# Patient Record
Sex: MALE | Race: OTHER
[De-identification: names, ages, dates, MRNs, and addresses within clinical notes are randomized per-mention and may not be internally consistent; named-entity substitution may affect disease eponyms.]

---

## 2018-12-01 ENCOUNTER — HOSPITAL ENCOUNTER (INPATIENT)
Dept: HOSPITAL 74 - FER | Age: 78
LOS: 5 days | Discharge: ANOTHER HOSPITAL | DRG: 184 | End: 2018-12-06
Attending: INTERNAL MEDICINE | Admitting: INTERNAL MEDICINE
Payer: COMMERCIAL

## 2018-12-01 VITALS — BODY MASS INDEX: 23.3 KG/M2
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
## 2018-12-01 DIAGNOSIS — R55: ICD-10-CM
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
## 2018-12-01 DIAGNOSIS — R00.1: ICD-10-CM

## 2018-12-01 DIAGNOSIS — S37.011A: ICD-10-CM

## 2018-12-01 DIAGNOSIS — S22.41XA: Primary | ICD-10-CM

## 2018-12-01 DIAGNOSIS — E11.9: ICD-10-CM

## 2018-12-01 DIAGNOSIS — I10: ICD-10-CM

## 2018-12-01 DIAGNOSIS — R31.9: ICD-10-CM

## 2018-12-01 DIAGNOSIS — N40.0: ICD-10-CM

## 2018-12-01 LAB
ALBUMIN SERPL-MCNC: 3.6 G/DL (ref 3.5–5)
ALP SERPL-CCNC: 52 U/L (ref 32–92)
ALT SERPL-CCNC: 28 U/L (ref 10–40)
ANION GAP SERPL CALC-SCNC: 6 MMOL/L (ref 8–16)
APPEARANCE UR: (no result)
AST SERPL-CCNC: 32 U/L (ref 10–42)
BASOPHILS # BLD: 0.3 % (ref 0–2)
BILIRUB SERPL-MCNC: 0.5 MG/DL (ref 0.2–1)
BUN SERPL-MCNC: 25 MG/DL (ref 7–18)
CALCIUM SERPL-MCNC: 8.9 MG/DL (ref 8.4–10.2)
CHLORIDE SERPL-SCNC: 104 MMOL/L (ref 98–107)
CO2 SERPL-SCNC: 25 MMOL/L (ref 22–28)
CREAT SERPL-MCNC: 0.9 MG/DL (ref 0.6–1.3)
DEPRECATED RDW RBC AUTO: 13.2 % (ref 11.9–15.9)
EOSINOPHIL # BLD: 1.2 % (ref 0–4.5)
EPITH CASTS URNS QL MICRO: (no result) /HPF
GLUCOSE SERPL-MCNC: 152 MG/DL (ref 74–106)
HCT VFR BLD CALC: 43 % (ref 35.4–49)
HGB BLD-MCNC: 14.6 GM/DL (ref 11.7–16.9)
LYMPHOCYTES # BLD: 10.8 % (ref 8–40)
MCH RBC QN AUTO: 30.3 PG (ref 25.7–33.7)
MCHC RBC AUTO-ENTMCNC: 33.8 G/DL (ref 32–35.9)
MCV RBC: 89.6 FL (ref 80–96)
MONOCYTES # BLD AUTO: 7.5 % (ref 3.8–10.2)
NEUTROPHILS # BLD: 80.2 % (ref 42.8–82.8)
PH UR: 6.5 [PH] (ref 4.5–8)
PLATELET # BLD AUTO: 173 K/MM3 (ref 134–434)
PMV BLD: 9.7 FL (ref 7.5–11.1)
POTASSIUM SERPLBLD-SCNC: 3.8 MMOL/L (ref 3.5–5.1)
PROT SERPL-MCNC: 5.7 G/DL (ref 6.4–8.3)
PROT UR QL STRIP: (no result)
RBC # BLD AUTO: 4.8 M/MM3 (ref 4–5.6)
RBC # BLD AUTO: >100 /HPF (ref 0–3)
SODIUM SERPL-SCNC: 135 MMOL/L (ref 136–145)
SP GR UR: >= 1.03 (ref 1.01–1.03)
UROBILINOGEN UR STRIP-MCNC: 1 MG/DL (ref 0.2–1)
WBC # BLD AUTO: 7 K/MM3 (ref 4–10.8)
WBC # UR AUTO: (no result) /UL (ref 0–2)

## 2018-12-01 PROCEDURE — G0378 HOSPITAL OBSERVATION PER HR: HCPCS

## 2018-12-01 PROCEDURE — A9502 TC99M TETROFOSMIN: HCPCS

## 2018-12-01 NOTE — PDOC
History of Present Illness





- General


History Source: Patient


Exam Limitations: No Limitations





- History of Present Illness


Initial Comments: 





12/01/18 20:49


The patient is a 78 year old male, with a significant PMH of HTN, Lyme disease (

diagnosed in August) and enlarged prostate,  who presents to the emergency 

department with an episode of vertigo that occurred 2 hours ago. The patient 

states he bent down to get something in the bathroom when he suddenly felt like 

the whole room was spinning. The patient admits to losing consciousness for a 

few seconds, falling and apparently hitting right sided body something within 

the bathroom. Family found patient conscious without evidence of seizure 

activity or incontinence. He experienced 1 episode of vomiting after the 

incident. The patient states this has never happened to him before and is 

currently complaining of right sided rib pain. He mentions he usually takes 

aspirin (81 mg)  everyday but didnt take it today. The patient denies chest 

pain, shortness of breath and  headache. Denies fever, chills, nausea, vomit, 

diarrhea and constipation.


 


Allergies: NKDA


Past surgical history: None reported


Social history: None reported


PCP: None reported 











<Jeni Gunn - Last Filed: 12/01/18 21:53>





<Dina Chavez - Last Filed: 12/02/18 00:40>





- General


Chief Complaint: Lightheaded


Stated Complaint: DIZZY


Time Seen by Provider: 12/01/18 19:24





Past History





<Jeni Gunn - Last Filed: 12/01/18 21:53>





- Past Medical History


COPD: No


Diabetes: Yes


 Disorders: Yes (BPH)


HTN: Yes





- Surgical History


Cholecystectomy: Yes





- Suicide/Smoking/Psychosocial Hx


Smoking History: Never smoked


Hx Alcohol Use: Yes (RARE)


Drug/Substance Use Hx: No





<Dina Chavez - Last Filed: 12/02/18 00:40>





- Past Medical History


Allergies/Adverse Reactions: 


 Allergies











Allergy/AdvReac Type Severity Reaction Status Date / Time


 


No Known Allergies Allergy   Verified 12/01/18 18:51











Home Medications: 


Ambulatory Orders





Aspirin [Aspirin EC] 81 mg PO HS 12/01/18 


Metformin HCl [Glucophage] 500 mg PO BID 12/01/18 


Tamsulosin HCl [Flomax] 0.4 mg PO BID 12/01/18 


Valsartan/Hydrochlorothiazide [Valsartan-Hctz 160-25 mg Tab] 1 each PO DAILY 12/ 01/18 











**Review of Systems





- Review of Systems


Able to Perform ROS?: Yes


Comments:: 





12/01/18 20:50


GENERAL/CONSTITUTIONAL: No fever or chills. No weakness.


HEAD, EYES, EARS, NOSE AND THROAT: No change in vision. No ear pain or 

discharge. No sore throat.


CARDIOVASCULAR: No chest pain or shortness of breath.


RESPIRATORY: No cough, wheezing, or hemoptysis.


GASTROINTESTINAL: No nausea, vomiting, diarrhea or constipation.


GENITOURINARY: No dysuria, frequency, or change in urination.


MUSCULOSKELETAL:+Rib pain 


SKIN: No rash


NEUROLOGIC: No headache, vertigo, loss of consciousness, or change in strength/

sensation.


ENDOCRINE: No increased thirst. No abnormal weight change.


HEMATOLOGIC/LYMPHATIC: No anemia, easy bleeding, or history of blood clots.


ALLERGIC/IMMUNOLOGIC: No hives or skin allergy.








<Jeni Gunn - Last Filed: 12/01/18 21:53>





*Physical Exam





- Vital Signs


 Last Vital Signs











Temp Pulse Resp BP Pulse Ox


 


 97.4 F L  46 L  16   144/72   97 


 


 12/01/18 18:33  12/01/18 18:33  12/01/18 18:33  12/01/18 18:33  12/01/18 18:33














- Physical Exam


Comments: 





12/01/18 20:51


GENERAL: Awake, alert, and fully oriented, in no acute distress


HEAD: Non bleeding half cm partial thickness laceration to the right forehead 

tenderness no ecchymosis or edema 


EYES: PERRLA, EOMI, sclera anicteric, conjunctiva clear


ENT: Auricles normal inspection, hearing grossly normal, nares patent, 

oropharynx clear without exudates. Moist mucosa


NECK: Normal ROM, supple, no lymphadenopathy, JVD, or masses


LUNGS: +Chest wall 3 cm x 4 cm edema mild tenderness to the right posterior 

chest from 7-9 ribs in posterior axillary line. Breath sounds equal, clear to 

auscultation bilaterally.  No wheezes, and no crackles


HEART: Regular rate and rhythm, normal S1 and S2, no murmurs, rubs or gallops


ABDOMEN:+ Notable for right upper and right flank moderate tenderness no 

guarding rebound or mass 


EXTREMITIES: Normal range of motion, no edema.  No clubbing or cyanosis. No 

cords, erythema, or tenderness


NEUROLOGICAL: Cranial nerves II through XII grossly intact.  Normal speech, 

normal gait


SKIN: Warm, Dry, normal turgor, no rashes or lesions noted.











<Jeni Gunn - Last Filed: 12/01/18 21:53>





- Vital Signs


 Last Vital Signs











Temp Pulse Resp BP Pulse Ox


 


 97.4 F L  46 L  16   144/72   97 


 


 12/01/18 18:33  12/01/18 18:33  12/01/18 18:33  12/01/18 18:33  12/01/18 18:33














<Dina Chavez - Last Filed: 12/02/18 00:40>





Moderate Sedation





- Procedure Monitoring


Vital Signs: 


Procedure Monitoring Vital Signs











Temperature  97.4 F L  12/01/18 18:33


 


Pulse Rate  46 L  12/01/18 18:33


 


Respiratory Rate  16   12/01/18 18:33


 


Blood Pressure  144/72   12/01/18 18:33


 


O2 Sat by Pulse Oximetry (%)  97   12/01/18 18:33











<Jeni Gunn - Last Filed: 12/01/18 21:53>





- Procedure Monitoring


Vital Signs: 


Procedure Monitoring Vital Signs











Temperature  97.4 F L  12/01/18 18:33


 


Pulse Rate  46 L  12/01/18 18:33


 


Respiratory Rate  16   12/01/18 18:33


 


Blood Pressure  144/72   12/01/18 18:33


 


O2 Sat by Pulse Oximetry (%)  97   12/01/18 18:33











<Dina Chavez - Last Filed: 12/02/18 00:40>





ED Treatment Course





- LABORATORY


CBC & Chemistry Diagram: 


 12/01/18 19:30





 12/01/18 19:30





- ADDITIONAL ORDERS


Additional order review: 


 Laboratory  Results











  12/01/18 12/01/18 12/01/18





  20:00 19:30 19:30


 


Sodium   135 L 


 


Potassium   3.8 


 


Chloride   104 


 


Carbon Dioxide   25 


 


Anion Gap   6 L 


 


BUN   25 H 


 


Creatinine   0.9 


 


Creat Clearance w eGFR   > 60 


 


Random Glucose   152 H 


 


Calcium   8.9 


 


Total Bilirubin   0.5 


 


AST   32 


 


ALT   28 


 


Alkaline Phosphatase   52 


 


Creatine Kinase   66 


 


Troponin I    < 0.03


 


Total Protein   5.7 L 


 


Albumin   3.6 


 


Urine Color  Yellow  


 


Urine Appearance  Cloudy  


 


Urine pH  6.5  


 


Ur Specific Gravity  >= 1.030  


 


Urine Protein  2+ H  


 


Urine Glucose (UA)  Negative  


 


Urine Ketones  Trace  


 


Urine Blood  3+ H  


 


Urine Nitrite  Negative  


 


Urine Bilirubin  Negative  


 


Urine Urobilinogen  1.0  


 


Ur Leukocyte Esterase  Negative  


 


Urine RBC  >100  


 


Urine WBC  2-5  


 


Ur Epithelial Cells  Few  








 











  12/01/18





  19:30


 


RBC  4.80


 


MCV  89.6


 


MCHC  33.8


 


RDW  13.2


 


MPV  9.7


 


Neutrophils %  80.2


 


Lymphocytes %  10.8


 


Monocytes %  7.5


 


Eosinophils %  1.2


 


Basophils %  0.3














<Jeni Gunn - Last Filed: 12/01/18 21:53>





- LABORATORY


CBC & Chemistry Diagram: 


 12/01/18 19:30





 12/01/18 19:30





<Dina Chavez - Last Filed: 12/02/18 00:40>





Medical Decision Making





- Medical Decision Making





Documentation has been prepared under my direction and personally reviewed by 

me in its entirety. I attest that this documented accurately reflects all work, 

treatment, procedures and medical decision making performed by me.





As noted above, this 78-year-old man with a history of hypertension/Lyme 

disease (diagnosed and treated a few months ago)/BPH was brought in by 

ambulance after an episode of vertigo and fall in the bathroom of his daughter'

s home (patient lives in [place] and is visiting the area).  He had a brief LOC 

after this severe vertigo; family did not witness the episode but was at patient

's side very quickly.  He had regained consciousness prior to their arrival.  

No incontinence or tonic-clonic movements noted.  Patient was episode of 

vomiting after the incident.  He states that vertigo resolved very quickly (

prior to arrival of EMS).  Patient's only complaint is right lower posterior 

back/flank discomfort.  Exam as noted.





12-lead electrocardiogram is performed and evaluated by me: Sinus bradycardia 

at 51 bpm; intervals, wave forms and axis are all normal.  No evidence of acute 

ST or T-wave abnormalities; no evidence of cardiac arrhythmia





Laboratory evaluation notable for prerenal azotemia with BUN of 25 and 

creatinine of 0.9.  Urinalysis shows hematuria with greater than 100 RBCs per 

high-power field.





Noncontrast head CT/chest and abdominal/pelvic CT with IV contrast ordered


12/01/18 23:47


Noncontrast head CT reveals no evidence of acute intracranial process; no skull 

fractures





Abdominal/pelvic CT interpreted by [facility]: Displaced fractures of the 

right ninth and 10th ribs and nondisplaced fracture of the 11th rib.  There is 

a circumferential right renal subcapsular hematoma that is 1.5 cm thick.


12/02/18 00:40


Case discussed with Dr Roth; patient will be admitted telemetry bed, 

observation status with diagnosis of syncope/multiple rib fractures/right renal 

hematoma





<Dina Chavez - Last Filed: 12/02/18 00:40>





*DC/Admit/Observation/Transfer





- Attestations


Scribe Attestion: 





12/01/18 20:53





Documentation prepared by Jeni Gunn, acting as medical scribe for Dina Chavez MD.





<Jeni Gunn - Last Filed: 12/01/18 21:53>





- Discharge Dispostion


Decision to Admit order: Yes





<Dina Chavez - Last Filed: 12/02/18 00:40>


Diagnosis at time of Disposition: 


Multiple rib fractures


Qualifiers:


 Encounter type: initial encounter Fracture type: closed Laterality: right 

Qualified Code(s): S22.41XA - Multiple fractures of ribs, right side, initial 

encounter for closed fracture





Renal hematoma, right


Qualifiers:


 Encounter type: initial encounter Qualified Code(s): S37.011A - Minor 

contusion of right kidney, initial encounter





Syncope


Qualifiers:


 Syncope type: unspecified Qualified Code(s): R55 - Syncope and collapse








- Discharge Dispostion


Condition at time of disposition: Stable

## 2018-12-02 LAB
ALBUMIN SERPL-MCNC: 3.2 G/DL (ref 3.5–5)
ALP SERPL-CCNC: 48 U/L (ref 32–92)
ALT SERPL-CCNC: 23 U/L (ref 10–40)
ANION GAP SERPL CALC-SCNC: 9 MMOL/L (ref 8–16)
AST SERPL-CCNC: 22 U/L (ref 10–42)
BILIRUB SERPL-MCNC: 1.1 MG/DL (ref 0.2–1)
BUN SERPL-MCNC: 22 MG/DL (ref 7–18)
CALCIUM SERPL-MCNC: 8.6 MG/DL (ref 8.4–10.2)
CHLORIDE SERPL-SCNC: 103 MMOL/L (ref 98–107)
CO2 SERPL-SCNC: 25 MMOL/L (ref 22–28)
CREAT SERPL-MCNC: 0.8 MG/DL (ref 0.6–1.3)
DEPRECATED RDW RBC AUTO: 13.4 % (ref 11.9–15.9)
GLUCOSE SERPL-MCNC: 122 MG/DL (ref 74–106)
HCT VFR BLD CALC: 36.4 % (ref 35.4–49)
HGB BLD-MCNC: 12.4 GM/DL (ref 11.7–16.9)
MAGNESIUM SERPL-MCNC: 1.8 MG/DL (ref 1.8–2.4)
MCH RBC QN AUTO: 30.9 PG (ref 25.7–33.7)
MCHC RBC AUTO-ENTMCNC: 34.1 G/DL (ref 32–35.9)
MCV RBC: 90.5 FL (ref 80–96)
PLATELET # BLD AUTO: 154 K/MM3 (ref 134–434)
PMV BLD: 9.8 FL (ref 7.5–11.1)
POTASSIUM SERPLBLD-SCNC: 3.8 MMOL/L (ref 3.5–5.1)
PROT SERPL-MCNC: 5 G/DL (ref 6.4–8.3)
RBC # BLD AUTO: 4.02 M/MM3 (ref 4–5.6)
SODIUM SERPL-SCNC: 137 MMOL/L (ref 136–145)
WBC # BLD AUTO: 7.3 K/MM3 (ref 4–10.8)

## 2018-12-02 RX ADMIN — INSULIN ASPART SCH: 100 INJECTION, SOLUTION INTRAVENOUS; SUBCUTANEOUS at 12:00

## 2018-12-02 RX ADMIN — INSULIN ASPART SCH: 100 INJECTION, SOLUTION INTRAVENOUS; SUBCUTANEOUS at 19:01

## 2018-12-02 RX ADMIN — INSULIN ASPART SCH: 100 INJECTION, SOLUTION INTRAVENOUS; SUBCUTANEOUS at 21:16

## 2018-12-02 RX ADMIN — VALSARTAN SCH MG: 160 TABLET, FILM COATED ORAL at 11:10

## 2018-12-02 RX ADMIN — INSULIN ASPART SCH: 100 INJECTION, SOLUTION INTRAVENOUS; SUBCUTANEOUS at 11:00

## 2018-12-02 RX ADMIN — TAMSULOSIN HYDROCHLORIDE SCH MG: 0.4 CAPSULE ORAL at 21:05

## 2018-12-02 RX ADMIN — HYDROCHLOROTHIAZIDE SCH MG: 25 TABLET ORAL at 11:10

## 2018-12-02 RX ADMIN — TAMSULOSIN HYDROCHLORIDE SCH MG: 0.4 CAPSULE ORAL at 11:10

## 2018-12-02 NOTE — HP
CHIEF COMPLAINT:dizziness 





PCP:





HISTORY OF PRESENT ILLNESS:


The patient is a 78 year old male, with a significant PMH of HTN, DM, Lyme 

disease (diagnosed in August) and enlarged prostate,  who presents to the 

emergency department with an episode of vertigo that occurred 2 hours ago. The 

patient states he bent down to get something in the bathroom when he suddenly 

felt like the whole room was spinning. The patient admits to losing 

consciousness for a few seconds, falling and apparently hitting right sided 

body something within the bathroom.  He experienced 1 episode of vomiting after 

the incident. 





patient seen at bedside this morning, denies dizziness, sob, chest pain.  

Denies fever, chills, nausea, vomit, diarrhea and constipation.


 





ER course was notable for:


(1)CT abd pelvis rib fracture 9th and 10th (displaced), 11th (non-displaced)


(2)Right renal subcapsular hematoma 


(3)no Leukocytosis 





Recent Travel:





PAST MEDICAL HISTORY:HTN, BPH, DM, 





PAST SURGICAL HISTORY:Cholecystectomy 





Social History:


Smoking:denies 


Alcohol:rarely 


Drugs:denies  





Family History:


AllergiesNKDA





No Known Allergies Allergy (Verified 12/01/18 18:51)


 








HOME MEDICATIONS:


 Home Medications











 Medication  Instructions  Recorded


 


Aspirin [Aspirin EC] 81 mg PO HS 12/01/18


 


Metformin HCl [Glucophage] 500 mg PO BID 12/01/18


 


Tamsulosin HCl [Flomax] 0.4 mg PO BID 12/01/18


 


Valsartan/Hydrochlorothiazide 1 each PO DAILY 12/01/18





[Valsartan-Hctz 160-25 mg Tab]  








REVIEW OF SYSTEMS


CONSTITUTIONAL: 


Absent:  fever, chills, diaphoresis, generalized weakness, malaise, loss of 

appetite, weight change


HEENT: 


Absent:  rhinorrhea, nasal congestion, throat pain, throat swelling, difficulty 

swallowing, mouth swelling, ear pain, eye pain, visual changes


CARDIOVASCULAR: 


Absent: chest pain, syncope, palpitations, irregular heart rate, lightheadedness

, peripheral edema


RESPIRATORY: 


Absent: cough, shortness of breath, dyspnea with exertion, orthopnea, wheezing, 

stridor, hemoptysis


GASTROINTESTINAL:


Absent: abdominal pain, abdominal distension, nausea, vomiting, diarrhea, 

constipation, melena, hematochezia


GENITOURINARY: 


Absent: dysuria, frequency, urgency, hesitancy, hematuria, flank pain, genital 

pain


MUSCULOSKELETAL: 


Absent: myalgia, arthralgia, joint swelling, back pain, neck pain


SKIN: 


Absent: rash, itching, pallor


HEMATOLOGIC/IMMUNOLOGIC: 


Absent: easy bleeding, easy bruising, lymphadenopathy, frequent infections


ENDOCRINE:


Absent: unexplained weight gain, unexplained weight loss, heat intolerance, 

cold intolerance


NEUROLOGIC: 


Absent: headache, focal weakness or paresthesias, dizziness, unsteady gait, 

seizure, mental status changes, bladder or bowel incontinence


PSYCHIATRIC: 


Absent: anxiety, depression, suicidal or homicidal ideation, hallucinations.








PHYSICAL EXAMINATION


 Vital Signs - 24 hr











  12/01/18 12/01/18 12/01/18





  18:33 21:00 21:29


 


Temperature 97.4 F L  


 


Pulse Rate 46 L  


 


Pulse Rate [  55 L 56 L





Apical]   


 


Respiratory 16 16 15





Rate   


 


Blood Pressure 144/72  


 


Blood Pressure  153/80 148/80





[Left Arm]   


 


O2 Sat by Pulse 97 97 98





Oximetry (%)   














  12/02/18 12/02/18 12/02/18





  00:33 01:52 04:33


 


Temperature 98.2 F 98.2 F 98.6 F


 


Pulse Rate 62 62 54 L


 


Pulse Rate [   





Apical]   


 


Respiratory 20 20 20





Rate   


 


Blood Pressure 144/60 144/60 124/50 L


 


Blood Pressure   





[Left Arm]   


 


O2 Sat by Pulse 97 97 





Oximetry (%)   














  12/02/18 12/02/18 12/02/18





  07:04 07:05 07:06


 


Temperature 98.6 F 98.6 F 98.6 F


 


Pulse Rate 51 L 56 L 63


 


Pulse Rate [   





Apical]   


 


Respiratory 18 18 18





Rate   


 


Blood Pressure 135/63 129/64 108/54 L


 


Blood Pressure   





[Left Arm]   


 


O2 Sat by Pulse   





Oximetry (%)   











GENERAL: Awake, alert, and fully oriented, in no acute distress.


HEAD: Normal with no signs of trauma.


EYES: Pupils equal, round and reactive to light, extraocular movements intact, 

sclera anicteric, conjunctiva clear. No lid lag.


EARS, NOSE, THROAT: Ears normal, nares patent, oropharynx clear without 

exudates. Moist mucous membranes.


NECK: Normal range of motion, supple without lymphadenopathy, JVD, or masses.


LUNGS: Breath sounds equal, clear to auscultation bilaterally. No wheezes, and 

no crackles. No accessory muscle use.


HEART: Regular rate and rhythm, normal S1 and S2 without murmur, rub or gallop.


ABDOMEN: Soft, nontender, not distended, normoactive bowel sounds, no guarding, 

no rebound, no masses.  No hepatomegaly or  splenomegaly. 


MUSCULOSKELETAL: Normal range of motion at all joints. No bony deformities or 

tenderness. No CVA tenderness.


UPPER EXTREMITIES: 2+ pulses, warm, well-perfused. No cyanosis. No clubbing. No 

peripheral edema.


LOWER EXTREMITIES: 2+ pulses, warm, well-perfused. No calf tenderness. No 

peripheral edema. 


NEUROLOGICAL:  Cranial nerves II-XII intact. Normal speech. Normal gait.


PSYCHIATRIC: Cooperative. Good eye contact. Appropriate mood and affect.


SKIN: Warm, dry, normal turgor, no rashes or lesions noted, normal capillary 

refill. 


 Laboratory Results - last 24 hr











  12/01/18 12/01/18 12/01/18





  19:30 19:30 19:30


 


WBC    7.0


 


RBC    4.80


 


Hgb    14.6


 


Hct    43.0


 


MCV    89.6


 


MCH    30.3


 


MCHC    33.8


 


RDW    13.2


 


Plt Count    173


 


MPV    9.7


 


Absolute Neuts (auto)    5.6


 


Neutrophils %    80.2


 


Lymphocytes %    10.8


 


Monocytes %    7.5


 


Eosinophils %    1.2


 


Basophils %    0.3


 


Sodium   135 L 


 


Potassium   3.8 


 


Chloride   104 


 


Carbon Dioxide   25 


 


Anion Gap   6 L 


 


BUN   25 H 


 


Creatinine   0.9 


 


Creat Clearance w eGFR   > 60 


 


Random Glucose   152 H 


 


Calcium   8.9 


 


Magnesium   


 


Total Bilirubin   0.5 


 


AST   32 


 


ALT   28 


 


Alkaline Phosphatase   52 


 


Creatine Kinase   66 


 


Troponin I  < 0.03  


 


Total Protein   5.7 L 


 


Albumin   3.6 


 


Urine Color   


 


Urine Appearance   


 


Urine pH   


 


Ur Specific Gravity   


 


Urine Protein   


 


Urine Glucose (UA)   


 


Urine Ketones   


 


Urine Blood   


 


Urine Nitrite   


 


Urine Bilirubin   


 


Urine Urobilinogen   


 


Ur Leukocyte Esterase   


 


Urine RBC   


 


Urine WBC   


 


Ur Epithelial Cells   














  12/01/18 12/02/18 12/02/18





  20:00 06:20 06:20


 


WBC   7.3 


 


RBC   4.02 


 


Hgb   12.4 


 


Hct   36.4  D 


 


MCV   90.5 


 


MCH   30.9 


 


MCHC   34.1 


 


RDW   13.4 


 


Plt Count   154 


 


MPV   9.8 


 


Absolute Neuts (auto)   


 


Neutrophils %   


 


Lymphocytes %   


 


Monocytes %   


 


Eosinophils %   


 


Basophils %   


 


Sodium    137


 


Potassium    3.8


 


Chloride    103


 


Carbon Dioxide    25


 


Anion Gap    9


 


BUN    22 H


 


Creatinine    0.8


 


Creat Clearance w eGFR    > 60


 


Random Glucose    122 H


 


Calcium    8.6


 


Magnesium    1.8


 


Total Bilirubin    1.1 H


 


AST    22  D


 


ALT    23


 


Alkaline Phosphatase    48


 


Creatine Kinase   


 


Troponin I   


 


Total Protein    5.0 L


 


Albumin    3.2 L


 


Urine Color  Yellow  


 


Urine Appearance  Cloudy  


 


Urine pH  6.5  


 


Ur Specific Gravity  >= 1.030  


 


Urine Protein  2+ H  


 


Urine Glucose (UA)  Negative  


 


Urine Ketones  Trace  


 


Urine Blood  3+ H  


 


Urine Nitrite  Negative  


 


Urine Bilirubin  Negative  


 


Urine Urobilinogen  1.0  


 


Ur Leukocyte Esterase  Negative  


 


Urine RBC  >100  


 


Urine WBC  2-5  


 


Ur Epithelial Cells  Few  











ASSESSMENT/PLAN:


Viral Rosenthal is a  78 year old male, with a significant PMH of HTN, DM, 

Lyme disease (diagnosed in August) and enlarged prostate,  admitted under 

observation for





Admitting Diagnosis


Syncope


Right Rib Fracture





Active Problems


BPH


HTN


DM





#Syncope


-tele monitoring


-Cardio consult 


-Echo ordered


-monitor labs


-CT head no acute fx, intracanial process


-IVF





#Right rib fx 2/2 fall


-inc spirometer


-CT Scan abd/pelvis displaced 9 &10th fx, non-displaced 11th rib fx


-pain mgt


-OOB to chair 





#Right renal subcapsular hematoma likely constusion from fall


-monitor


-heparin on hold 





#HTN


-resume home med





#BPH


-on flomax





#DM


-fingerstick monitoring


-metformin on hold


-ISC








 Disposition Requires to be observatin, Full Code





 





Visit type





- Emergency Visit


Emergency Visit: Yes


ED Registration Date: 12/02/18


Care time: The patient presented to the Emergency Department on the above date 

and was hospitalized for further evaluation of their emergent condition.





- New Patient


This patient is new to me today: Yes


Date on this admission: 12/02/18





- Critical Care


Critical Care patient: No

## 2018-12-02 NOTE — CON.CARD
Consult


Consult Specialty:: Cardiology





- History of Present Illness


History of Present Illness: 





The patient is a 78 year old male, with a significant PMH of HTN, Lyme disease (

diagnosed in August) and enlarged prostate,  who presents to the emergency 

department with an episode of vertigo that occurred 2 hours ago. The patient 

states he bent down to get something in the bathroom when he suddenly felt like 

the whole room was spinning. The patient admits to losing consciousness for a 

few seconds, falling and apparently hitting right sided body something within 

the bathroom. Family found patient conscious without evidence of seizure 

activity or incontinence. He experienced 1 episode of vomiting after the 

incident. The patient states this has never happened to him before and is 

currently complaining of right sided rib pain. He mentions he usually takes 

aspirin (81 mg)  everyday but didnt take it today. The patient denies chest 

pain, shortness of breath and  headache. Denies fever, chills, nausea, vomit, 

diarrhea and constipation.


 


Allergies: NKDA


Past surgical history: None reported


Social history: None reported


PCP: None reported 








- History Source


History Provided By: Patient, Family Member, Medical Record





- Past Medical History


Cardio/Vascular: Yes: HTN


Endocrine: Yes: Diabetes Mellitus





- Alcohol/Substance Use


Hx Alcohol Use: Yes (RARE)





- Smoking History


Smoking history: Never smoked


Have you smoked in the past 12 months: No





Home Medications





- Allergies


Allergies/Adverse Reactions: 


 Allergies











Allergy/AdvReac Type Severity Reaction Status Date / Time


 


No Known Allergies Allergy   Verified 12/01/18 18:51














- Home Medications


Home Medications: 


Ambulatory Orders





Aspirin [Aspirin EC] 81 mg PO HS 12/01/18 


Metformin HCl [Glucophage] 500 mg PO BID 12/01/18 


Tamsulosin HCl [Flomax] 0.4 mg PO BID 12/01/18 


Valsartan/Hydrochlorothiazide [Valsartan-Hctz 160-25 mg Tab] 1 each PO DAILY 12/ 01/18 











Review of Systems





- Review of Systems


Constitutional: reports: No Symptoms


Eyes: reports: No Symptoms


HENT: reports: No Symptoms


Neck: reports: No Symptoms


Cardiovascular: reports: No Symptoms


Gastrointestinal: reports: No Symptoms


Genitourinary: reports: No Symptoms


Breasts: reports: No Symptoms Reported


Musculoskeletal: reports: No Symptoms


Integumentary: reports: No Symptoms


Neurological: reports: Syncope


Endocrine: reports: No Symptoms


Hematology/Lymphatic: reports: No Symptoms


Psychiatric: reports: No Symptoms


Vital Signs: 


 Vital Signs











Temperature  98.6 F   12/02/18 07:06


 


Pulse Rate  63   12/02/18 07:06


 


Respiratory Rate  18   12/02/18 07:06


 


Blood Pressure  108/54 L  12/02/18 07:06


 


O2 Sat by Pulse Oximetry (%)  97   12/02/18 01:52











Constitutional: Yes: Well Nourished, No Distress, Calm


Eyes: Yes: WNL, Conjunctiva Clear, EOM Intact


HENT: Yes: WNL, Atraumatic, Normocephalic


Neck: Yes: WNL, Supple, Trachea Midline


Respiratory: Yes: WNL, Regular, CTA Bilaterally


Gastrointestinal: Yes: WNL, Normal Bowel Sounds


Renal/: Yes: WNL


Cardiovascular: Yes: WNL, Regular Rate and Rhythm


Musculoskeletal: Yes: WNL


Extremities: Yes: WNL


Integumentary: Yes: WNL


Neurological: Yes: WNL, Alert, Oriented


...Motor Strength: WNL


Psychiatric: Yes: WNL, Alert, Oriented





- Other Data


Labs, Other Data: 


 CBC, BMP





 12/01/18 19:30 





 12/01/18 19:30 





 Troponin, BNP











  12/01/18





  19:30


 


Troponin I  < 0.03








 Troponin, BNP











  12/01/18





  19:30


 


Troponin I  < 0.03














Imaging





- Results


Chest X-ray: Image Reviewed (no i/e)


EKG: Image Reviewed (s bradycardia at 51 o/w wnl)





Problem List





- Problems


(1) Multiple rib fractures


Code(s): S22.49XA - MULTIPLE FRACTURES OF RIBS, UNSP SIDE, INIT FOR CLOS FX   


Qualifiers: 


   Encounter type: initial encounter   Fracture type: closed   Laterality: 

right   Qualified Code(s): S22.41XA - Multiple fractures of ribs, right side, 

initial encounter for closed fracture   





(2) Renal hematoma, right


Code(s): S37.011A - MINOR CONTUSION OF RIGHT KIDNEY, INITIAL ENCOUNTER   


Qualifiers: 


   Encounter type: initial encounter   Qualified Code(s): S37.011A - Minor 

contusion of right kidney, initial encounter   





(3) Syncope


Code(s): R55 - SYNCOPE AND COLLAPSE   


Qualifiers: 


   Syncope type: unspecified   Qualified Code(s): R55 - Syncope and collapse   





Assessment/Plan





syncope - reports bending down to  something from the floor in the 

bathroom, than getting up than feeling progressively more dizzy/vertigo and 

than fainting


bradycardia on baseline ekg and telemetry


multiple rib fx


dm


htn








plan








neuro consult


c. duplex


telemetry


echo


lexiscan mibi

## 2018-12-03 LAB
ANION GAP SERPL CALC-SCNC: 6 MMOL/L (ref 8–16)
APPEARANCE UR: CLEAR
APTT BLD: 26.3 SECONDS (ref 25.2–36.5)
BACTERIA #/AREA URNS HPF: (no result) /HPF
BASOPHILS # BLD: 0.3 % (ref 0–2)
BILIRUB UR STRIP.AUTO-MCNC: NEGATIVE MG/DL
BUN SERPL-MCNC: 17 MG/DL (ref 7–18)
CALCIUM SERPL-MCNC: 8.9 MG/DL (ref 8.4–10.2)
CHLORIDE SERPL-SCNC: 102 MMOL/L (ref 98–107)
CHOLEST SERPL-MCNC: 142 MG/DL
CO2 SERPL-SCNC: 26 MMOL/L (ref 22–28)
COLOR UR: YELLOW
CREAT SERPL-MCNC: 0.8 MG/DL (ref 0.6–1.3)
DEPRECATED RDW RBC AUTO: 13.1 % (ref 11.9–15.9)
EOSINOPHIL # BLD: 1.8 % (ref 0–4.5)
EPITH CASTS URNS QL MICRO: (no result) /HPF
GLUCOSE SERPL-MCNC: 159 MG/DL (ref 74–106)
HCT VFR BLD CALC: 40.8 % (ref 35.4–49)
HDLC SERPL-MCNC: 56 MG/DL (ref 29–89)
HGB BLD-MCNC: 13.7 GM/DL (ref 11.7–16.9)
INR BLD: 1.16 (ref 0.82–1.09)
KETONES UR QL STRIP: NEGATIVE
LDLC SERPL CALC-MCNC: 71 MG/DL
LEUKOCYTE ESTERASE UR QL STRIP.AUTO: NEGATIVE
LYMPHOCYTES # BLD: 20.6 % (ref 8–40)
MAGNESIUM SERPL-MCNC: 1.9 MG/DL (ref 1.8–2.4)
MCH RBC QN AUTO: 30.6 PG (ref 25.7–33.7)
MCHC RBC AUTO-ENTMCNC: 33.7 G/DL (ref 32–35.9)
MCV RBC: 90.9 FL (ref 80–96)
MONOCYTES # BLD AUTO: 9.1 % (ref 3.8–10.2)
NEUTROPHILS # BLD: 68.2 % (ref 42.8–82.8)
NITRITE UR QL STRIP: NEGATIVE
PH UR: 5.5 [PH] (ref 4.5–8)
PLATELET # BLD AUTO: 166 K/MM3 (ref 134–434)
PMV BLD: 9.1 FL (ref 7.5–11.1)
POTASSIUM SERPLBLD-SCNC: 3.8 MMOL/L (ref 3.5–5.1)
PROT UR QL STRIP: NEGATIVE
PROT UR QL STRIP: NEGATIVE
PT PNL PPP: 13 SEC (ref 10.2–13)
RBC # BLD AUTO: (no result) /HPF (ref 0–3)
RBC # BLD AUTO: 4.49 M/MM3 (ref 4–5.6)
SODIUM SERPL-SCNC: 134 MMOL/L (ref 136–145)
SP GR UR: 1.01 (ref 1.01–1.03)
TRIGL SERPL-MCNC: 73 MG/DL (ref 35–160)
UROBILINOGEN UR STRIP-MCNC: 0.2 MG/DL (ref 0.2–1)
WBC # BLD AUTO: 7.1 K/MM3 (ref 4–10.8)
WBC # UR AUTO: (no result) /UL (ref 0–2)

## 2018-12-03 RX ADMIN — INSULIN ASPART SCH: 100 INJECTION, SOLUTION INTRAVENOUS; SUBCUTANEOUS at 15:54

## 2018-12-03 RX ADMIN — TAMSULOSIN HYDROCHLORIDE SCH MG: 0.4 CAPSULE ORAL at 21:21

## 2018-12-03 RX ADMIN — HYDROCHLOROTHIAZIDE SCH MG: 25 TABLET ORAL at 09:55

## 2018-12-03 RX ADMIN — INSULIN ASPART SCH: 100 INJECTION, SOLUTION INTRAVENOUS; SUBCUTANEOUS at 18:16

## 2018-12-03 RX ADMIN — INSULIN ASPART SCH: 100 INJECTION, SOLUTION INTRAVENOUS; SUBCUTANEOUS at 21:21

## 2018-12-03 RX ADMIN — VALSARTAN SCH MG: 160 TABLET, FILM COATED ORAL at 09:55

## 2018-12-03 RX ADMIN — TAMSULOSIN HYDROCHLORIDE SCH MG: 0.4 CAPSULE ORAL at 08:40

## 2018-12-03 RX ADMIN — INSULIN ASPART SCH: 100 INJECTION, SOLUTION INTRAVENOUS; SUBCUTANEOUS at 07:11

## 2018-12-03 NOTE — EKG
Test Reason : 

Blood Pressure : ***/*** mmHG

Vent. Rate : 051 BPM     Atrial Rate : 051 BPM

   P-R Int : 162 ms          QRS Dur : 094 ms

    QT Int : 452 ms       P-R-T Axes : 048 052 042 degrees

   QTc Int : 416 ms

 

SINUS BRADYCARDIA

OTHERWISE NORMAL ECG

NO PREVIOUS ECGS AVAILABLE

Confirmed by DARIUS MCNEIL MD ([id number]) on 12/3/2018 11:27:04 AM

 

Referred By: Physician Emergency Dept           Confirmed By:DARIUS MCNEIL MD

## 2018-12-03 NOTE — PN
Physical Exam: 


SUBJECTIVE: Patient seen and examined at bedside. Pain is better after 

oxycodone and tylenol.








OBJECTIVE:





 Vital Signs











 Period  Temp  Pulse  Resp  BP Sys/Erickson  Pulse Ox


 


 Last 24 Hr  98.5 F-99.1 F  51-68  16-19  115-154/52-76  95-97











GENERAL: The patient is awake, alert, and fully oriented, in no acute distress.


LUNGS: Breath sounds equal, clear to auscultation bilaterally, no wheezes, no 

crackles


HEART: Regular rate and rhythm, S1, S2 


ABDOMEN: Soft, nontender, nondistended


EXTREMITIES: 2+ pulses, warm, well-perfused, no edema. 


NEUROLOGICAL: Cranial nerves II through XII grossly intact. Normal speech

















 Laboratory Results - last 24 hr











  12/02/18 12/02/18 12/03/18





  06:45 21:07 05:44


 


WBC   


 


RBC   


 


Hgb   


 


Hct   


 


MCV   


 


MCH   


 


MCHC   


 


RDW   


 


Plt Count   


 


MPV   


 


Absolute Neuts (auto)   


 


Neutrophils %   


 


Lymphocytes %   


 


Monocytes %   


 


Eosinophils %   


 


Basophils %   


 


PT with INR   


 


INR   


 


PTT (Actin FS)   


 


Sodium   


 


Potassium   


 


Chloride   


 


Carbon Dioxide   


 


Anion Gap   


 


BUN   


 


Creatinine   


 


Creat Clearance w eGFR   


 


POC Glucometer   207  175


 


Random Glucose   


 


Calcium   


 


Magnesium   


 


Troponin I   


 


Triglycerides   


 


Cholesterol   


 


Total LDL Cholesterol   


 


HDL Cholesterol   


 


Urine Color  Yellow  


 


Urine Appearance  Clear  


 


Urine pH  5.5  


 


Ur Specific Gravity  1.015  


 


Urine Protein  Negative  


 


Urine Glucose (UA)  Negative  


 


Urine Ketones  Negative  


 


Urine Blood  3+ H  


 


Urine Nitrite  Negative  


 


Urine Bilirubin  Negative  


 


Urine Urobilinogen  0.2  


 


Ur Leukocyte Esterase  Negative  


 


Urine RBC  5-10  


 


Urine WBC  0-3  


 


Ur Epithelial Cells  Few  


 


Urine Bacteria  None seen  














  12/03/18 12/03/18 12/03/18





  08:00 08:23 08:23


 


WBC   7.1 


 


RBC   4.49 


 


Hgb   13.7 


 


Hct   40.8 


 


MCV   90.9 


 


MCH   30.6 


 


MCHC   33.7 


 


RDW   13.1 


 


Plt Count   166 


 


MPV   9.1 


 


Absolute Neuts (auto)   4.9 


 


Neutrophils %   68.2 


 


Lymphocytes %   20.6  D 


 


Monocytes %   9.1 


 


Eosinophils %   1.8 


 


Basophils %   0.3 


 


PT with INR   


 


INR   


 


PTT (Actin FS)   


 


Sodium    134 L


 


Potassium    3.8


 


Chloride    102


 


Carbon Dioxide    26


 


Anion Gap    6 L


 


BUN    17


 


Creatinine    0.8


 


Creat Clearance w eGFR    > 60


 


POC Glucometer   


 


Random Glucose    159 H D


 


Calcium    8.9


 


Magnesium    1.9


 


Troponin I  < 0.03  


 


Triglycerides   


 


Cholesterol   


 


Total LDL Cholesterol   


 


HDL Cholesterol   


 


Urine Color   


 


Urine Appearance   


 


Urine pH   


 


Ur Specific Gravity   


 


Urine Protein   


 


Urine Glucose (UA)   


 


Urine Ketones   


 


Urine Blood   


 


Urine Nitrite   


 


Urine Bilirubin   


 


Urine Urobilinogen   


 


Ur Leukocyte Esterase   


 


Urine RBC   


 


Urine WBC   


 


Ur Epithelial Cells   


 


Urine Bacteria   














  12/03/18 12/03/18





  08:23 08:23


 


WBC  


 


RBC  


 


Hgb  


 


Hct  


 


MCV  


 


MCH  


 


MCHC  


 


RDW  


 


Plt Count  


 


MPV  


 


Absolute Neuts (auto)  


 


Neutrophils %  


 


Lymphocytes %  


 


Monocytes %  


 


Eosinophils %  


 


Basophils %  


 


PT with INR   13.0


 


INR   1.16


 


PTT (Actin FS)   26.3


 


Sodium  


 


Potassium  


 


Chloride  


 


Carbon Dioxide  


 


Anion Gap  


 


BUN  


 


Creatinine  


 


Creat Clearance w eGFR  


 


POC Glucometer  


 


Random Glucose  


 


Calcium  


 


Magnesium  


 


Troponin I  


 


Triglycerides  73 


 


Cholesterol  142 


 


Total LDL Cholesterol  71 


 


HDL Cholesterol  56 


 


Urine Color  


 


Urine Appearance  


 


Urine pH  


 


Ur Specific Gravity  


 


Urine Protein  


 


Urine Glucose (UA)  


 


Urine Ketones  


 


Urine Blood  


 


Urine Nitrite  


 


Urine Bilirubin  


 


Urine Urobilinogen  


 


Ur Leukocyte Esterase  


 


Urine RBC  


 


Urine WBC  


 


Ur Epithelial Cells  


 


Urine Bacteria  








                           


 Current Medications











Generic Name Dose Route Start Last Admin





  Trade Name Freq  PRN Reason Stop Dose Admin


 


Acetaminophen  650 mg  12/03/18 15:10  12/03/18 15:56





  Tylenol -  PO   650 mg





  Q6H PRN   Administration





  PAIN LEVEL 6-10   





     





     





     


 


Hydrochlorothiazide  25 mg  12/02/18 10:00  12/03/18 09:55





  Hctz -  PO   25 mg





  DAILY JYOTI   Administration





     





     





     





     


 


Insulin Aspart  1 vial  12/02/18 07:00  





  Novolog Vial Sliding Scale -  SQ   





  [facility]   





     





     





  Protocol   





     


 


Oxycodone HCl  5 mg  12/03/18 16:59  





  Roxicodone -  PO   





  Q6H PRN   





  PAIN LEVEL 6-10   





     





     





     


 


Tamsulosin HCl  0.4 mg  12/02/18 08:30  12/03/18 21:21





  Flomax -  PO   0.4 mg





  BID@0830,2200 JYOTI   Administration





     





     





     





     


 


Valsartan  160 mg  12/02/18 10:00  12/03/18 09:55





  Diovan -  PO   160 mg





  DAILY JYOTI   Administration





     





     





     





     














ASSESSMENT/PLAN


78 year-old male with a PMH significant for HTN, enlarged prostate and recent 

Lyme's disease. Admitted for multiple rib fractures following a syncopal 

episode and fall at home.





Syncope


   --serial troponins negative


   --US carotids: no hemodynamically significant stenosis


   --12/3 Echo: LV normal, EF 60-65%, RV normal; mild TR; pHTN


   --seen and evaluated by cardiology, plan is for persantine stress, but will 

need urology evaluation first





Multiple rib fractures


   --CT: acute displaced fractures right 9, 10, 11 posteriorly, and right 8,9 

laterally; trace right pleural effusion


   --ultram not sufficient; better relief with oxycodone + tylenol


   --binder


   --bowel regimen





Right renal hematoma


Possible right renal cortex laceration


Hematuria


   --CT: acute right renal hematoma 1.5cm thickness; 0.9cm focus right renal 

cortex may be a laceration, no definite contrast extravasation


   --hematuria resolved on repeat UA


   --h/h stable


   --urology consult pending





Hypertension


   --continue valsartan, HCTZ








Visit type





- Emergency Visit


Emergency Visit: Yes


ED Registration Date: 12/02/18


Care time: The patient presented to the Emergency Department on the above date 

and was hospitalized for further evaluation of their emergent condition.





- New Patient


This patient is new to me today: Yes


Date on this admission: 12/04/18





- Critical Care


Critical Care patient: No

## 2018-12-03 NOTE — ECHO
______________________________________________________________________________



Name: ALYSA ARCHER                               Exam:Adult Echocardiogram

MRN: [id number]            Study Date: 2018 12:33 PM

Age: 78 yrs

______________________________________________________________________________



Reason For Study: HTN

Height: 70 in        Weight: 161 lb        BSA: 1.9 m2



______________________________________________________________________________



MMode/2D Measurements & Calculations

IVSd: 3.1 cm                                          Ao root diam: 3.6 cm

LVIDd: 3.8 cm                                         LA dimension: 2.4 cm

LVIDs: 2.9 cm

LVPWd: 1.0 cm



_______________________________________________________

EDV(Teich): 63.9 ml

ESV(Teich): 32.4 ml



Doppler Measurements & Calculations

MV E max deanne: 58.2 cm/sec

MV A max deanne: 72.0 cm/sec                            MV dec slope: 275.0 cm/sec2

MV E/A: 0.81



______________________________________________________

MR max deanne: 448.0 cm/sec                             TR max deanne: 252.6 cm/sec

MR max P.3 mmHg                                 TR max P.6 mmHg



______________________________________________________

PI end-d deanne: 64.1 cm/sec





______________________________________________________________________________

Procedure

A complete two-dimensional transthoracic echocardiogram was performed (2D, M-mode, Doppler and color 
flow

Doppler).

Left Ventricle

The left ventricle is normal in size. Left ventricular systolic function is normal. Ejection Fraction
 = 60-

65%. No regional wall motion abnormalities noted.

Right Ventricle

The right ventricle is normal size. The right ventricular systolic function is normal.

Atria

The left atrial size is normal. Right atrial size is normal.

Mitral Valve

There is mild mitral annular calcification. There is no mitral regurgitation noted.

Tricuspid Valve

The tricuspid valve is normal in structure and function. There is mild tricuspid regurgitation. Pulmo
nary

artery systolic pressure is at least 31 mmHg assuming RA pressure of 3 mmHg.

Aortic Valve

The aortic valve is normal in structure and function. No aortic regurgitation is present.

Pulmonic Valve

The pulmonic valve is not well visualized.

Great Vessels

The aortic root is normal size.

Pericardium/Pleura

There is no pericardial effusion.



______________________________________________________________________________



Interpretation Summary

The left ventricle is normal in size.

Left ventricular systolic function is normal.

No regional wall motion abnormalities noted.

Ejection Fraction = 60-65%.

The right ventricular systolic function is normal.

The left atrial size is normal.

Right atrial size is normal.

There is mild mitral annular calcification.

There is mild tricuspid regurgitation.

Pulmonary artery systolic pressure is at least 31 mmHg assuming RA pressure of 3 mmHg

There is no pericardial effusion.



Previous study is not available for comparison





Paul Cheung MD 2018 04:50 PM

## 2018-12-03 NOTE — CON.GU
Consult


Consult Specialty:: 


Referred by:: Toñito


Reason for Consultation:: R renal hematoma





- History of Present Illness


Chief Complaint: rib fx


History of Present Illness: 





78 year old male, with a significant PMH of HTN, DM, Lyme disease (diagnosed in 

August) and enlarged prostate,  who presents to the emergency department with 

an episode of vertigo that occurred 2 hours ago. The patient states he bent 

down to get something in the bathroom when he suddenly felt like the whole room 

was spinning. The patient admits to losing consciousness for a few seconds, 

falling and apparently hitting right sided body something within the bathroom.  

He experienced 1 episode of vomiting after the incident as well as gross 

hematuria x 1 episode.





patient seen at bedside this morning, denies dizziness, sob, chest pain.  

Denies fever, chills, nausea, vomit, diarrhea and constipation.


 





ER course was notable for:


(1)CT abd pelvis rib fracture 9th and 10th (displaced), 11th (non-displaced)


(2)Right renal subcapsular hematoma 


(3)no Leukocytosis 





- History Source


History Provided By: Patient, Family Member, Medical Record


Limitations to Obtaining History: No Limitations





- Past Medical History


Cardio/Vascular: Yes: HTN


Renal/: Yes: Hematuria


Endocrine: Yes: Diabetes Mellitus





- Alcohol/Substance Use


Hx Alcohol Use: Yes (RARE)





- Smoking History


Smoking history: Never smoked


Have you smoked in the past 12 months: No





Home Medications





- Allergies


Allergies/Adverse Reactions: 


 Allergies











Allergy/AdvReac Type Severity Reaction Status Date / Time


 


No Known Allergies Allergy   Verified 12/01/18 18:51














- Home Medications


Home Medications: 


Ambulatory Orders





Aspirin [Aspirin EC] 81 mg PO HS 12/01/18 


Metformin HCl [Glucophage] 500 mg PO BID 12/01/18 


Tamsulosin HCl [Flomax] 0.4 mg PO BID 12/01/18 


Valsartan/Hydrochlorothiazide [Valsartan-Hctz 160-25 mg Tab] 1 each PO DAILY 12/ 01/18 











Physical Exam-


Vital Signs: 


 Vital Signs











Temperature  97.7 F   12/03/18 10:00


 


Pulse Rate  57 L  12/03/18 13:16


 


Respiratory Rate  18   12/03/18 10:00


 


Blood Pressure  130/62   12/03/18 13:16


 


O2 Sat by Pulse Oximetry (%)  96   12/03/18 10:00











Renal/: Yes: CVA Tenderness - Right


Kidneys: Yes: FLank Pain Right


Labs: 


 CBC, BMP





 12/03/18 08:23 





 12/03/18 08:23 











Imaging





- Results


Cat Scan: Report Reviewed, Image Reviewed





Problem List





- Problems


(1) Hematuria


Assessment/Plan: 


resolved


Code(s): R31.9 - HEMATURIA, UNSPECIFIED   


Qualifiers: 


   Hematuria type: gross   Qualified Code(s): R31.0 - Gross hematuria   





(2) Renal hematoma, right


Assessment/Plan: 


f/u renal u/s as outpt


Code(s): S37.011A - MINOR CONTUSION OF RIGHT KIDNEY, INITIAL ENCOUNTER   


Qualifiers: 


   Encounter type: initial encounter   Qualified Code(s): S37.011A - Minor 

contusion of right kidney, initial encounter

## 2018-12-03 NOTE — PN
Progress Note, Physician


History of Present Illness: 





The patient is a 78 year old male, with a significant PMH of HTN, Lyme disease (

diagnosed in August) and enlarged prostate,  who presents to the emergency 

department with an episode of vertigo that occurred 2 hours ago. The patient 

states he bent down to get something in the bathroom when he suddenly felt like 

the whole room was spinning. The patient admits to losing consciousness for a 

few seconds, falling and apparently hitting right sided body something within 

the bathroom. Family found patient conscious without evidence of seizure 

activity or incontinence. He experienced 1 episode of vomiting after the 

incident. The patient states this has never happened to him before and is 

currently complaining of right sided rib pain. He mentions he usually takes 

aspirin (81 mg)  everyday but didnt take it today. The patient denies chest 

pain, shortness of breath and  headache. Denies fever, chills, nausea, vomit, 

diarrhea and constipation.


 


Allergies: NKDA


Past surgical history: None reported


Social history: None reported


PCP: None reported 








- Current Medication List


Current Medications: 


Active Medications





Acetaminophen (Tylenol -)  650 mg PO Q6H PRN


   PRN Reason: PAIN LEVEL 1-5


   Last Admin: 12/03/18 15:56 Dose:  650 mg


Hydrochlorothiazide (Hctz -)  25 mg PO DAILY [facility]


   Last Admin: 12/03/18 09:55 Dose:  25 mg


Insulin Aspart (Novolog Vial Sliding Scale -)  [facility]; Protocol


   Last Admin: 12/03/18 15:54 Dose:  Not Given


Oxycodone HCl (Roxicodone -)  5 mg PO ONCE ONE


   Stop: 12/03/18 15:10


   Last Admin: 12/03/18 15:16 Dose:  5 mg


Tamsulosin HCl (Flomax -)  0.4 mg PO BID@0830,2200 [facility]


   Last Admin: 12/03/18 08:40 Dose:  0.4 mg


Tramadol HCl (Ultram -)  50 mg PO Q4H PRN


   PRN Reason: PAIN LEVEL 6-10


   Last Admin: 12/03/18 13:08 Dose:  50 mg


Valsartan (Diovan -)  160 mg PO DAILY [facility]


   Last Admin: 12/03/18 09:55 Dose:  160 mg











- Objective


Vital Signs: 


 Vital Signs











Temperature  98.0 F   12/03/18 14:00


 


Pulse Rate  58 L  12/03/18 14:00


 


Respiratory Rate  18   12/03/18 14:00


 


Blood Pressure  120/66   12/03/18 14:00


 


O2 Sat by Pulse Oximetry (%)  96   12/03/18 10:00











Eyes: Yes: WNL, Conjunctiva Clear, EOM Intact


HENT: Yes: WNL, Atraumatic, Normocephalic


Neck: Yes: WNL, Supple, Trachea Midline


Cardiovascular: Yes: WNL, Regular Rate and Rhythm


Respiratory: Yes: WNL, Regular, CTA Bilaterally


Gastrointestinal: Yes: WNL, Normal Bowel Sounds


Genitourinary: Yes: WNL


Musculoskeletal: Yes: WNL


Extremities: Yes: WNL


Edema: No


Integumentary: Yes: WNL


Neurological: Yes: WNL, Alert, Oriented


...Motor Strength: WNL


Psychiatric: Yes: WNL


Labs: 


 CBC, BMP





 12/03/18 08:23 





 12/03/18 08:23 





 INR, PTT











INR  1.16  (0.82-1.09)   12/03/18  08:23    














Problem List





- Problems


(1) Multiple rib fractures


Code(s): S22.49XA - MULTIPLE FRACTURES OF RIBS, UNSP SIDE, INIT FOR CLOS FX   


Qualifiers: 


   Encounter type: initial encounter   Fracture type: closed   Laterality: 

right   Qualified Code(s): S22.41XA - Multiple fractures of ribs, right side, 

initial encounter for closed fracture   





(2) Renal hematoma, right


Code(s): S37.011A - MINOR CONTUSION OF RIGHT KIDNEY, INITIAL ENCOUNTER   


Qualifiers: 


   Encounter type: initial encounter   Qualified Code(s): S37.011A - Minor 

contusion of right kidney, initial encounter   





(3) Syncope


Code(s): R55 - SYNCOPE AND COLLAPSE   


Qualifiers: 


   Syncope type: unspecified   Qualified Code(s): R55 - Syncope and collapse   





Assessment/Plan





syncope - reports bending down to  something from the floor in the 

bathroom, than getting up than feeling progressively more dizzy/vertigo and 

than fainting


bradycardia on baseline ekg and telemetry


multiple rib fx


dm


htn


renal hematoma


c. duplex - no occlusion or stenosis





plan;


neuro consult


telemetry


echo


lexiscan mibi

## 2018-12-04 RX ADMIN — TAMSULOSIN HYDROCHLORIDE SCH MG: 0.4 CAPSULE ORAL at 13:45

## 2018-12-04 RX ADMIN — INSULIN ASPART SCH: 100 INJECTION, SOLUTION INTRAVENOUS; SUBCUTANEOUS at 17:10

## 2018-12-04 RX ADMIN — TAMSULOSIN HYDROCHLORIDE SCH MG: 0.4 CAPSULE ORAL at 21:25

## 2018-12-04 RX ADMIN — VALSARTAN SCH: 160 TABLET, FILM COATED ORAL at 16:23

## 2018-12-04 RX ADMIN — INSULIN ASPART SCH: 100 INJECTION, SOLUTION INTRAVENOUS; SUBCUTANEOUS at 11:00

## 2018-12-04 RX ADMIN — POLYETHYLENE GLYCOL 3350 SCH GRAMS: 17 POWDER, FOR SOLUTION ORAL at 21:30

## 2018-12-04 RX ADMIN — INSULIN ASPART SCH: 100 INJECTION, SOLUTION INTRAVENOUS; SUBCUTANEOUS at 06:12

## 2018-12-04 RX ADMIN — HYDROCHLOROTHIAZIDE SCH MG: 25 TABLET ORAL at 11:30

## 2018-12-04 RX ADMIN — INSULIN ASPART SCH: 100 INJECTION, SOLUTION INTRAVENOUS; SUBCUTANEOUS at 21:25

## 2018-12-04 RX ADMIN — DOCUSATE SODIUM SCH MG: 100 CAPSULE, LIQUID FILLED ORAL at 21:24

## 2018-12-04 RX ADMIN — INSULIN ASPART SCH UNITS: 100 INJECTION, SOLUTION INTRAVENOUS; SUBCUTANEOUS at 17:10

## 2018-12-04 NOTE — CON.NEURO
Consult





- Past Medical History


Cardio/Vascular: Yes: HTN


Renal/: Yes: Hematuria


Endocrine: Yes: Diabetes Mellitus





- Alcohol/Substance Use


Hx Alcohol Use: Yes (RARE)





- Smoking History


Smoking history: Never smoked


Have you smoked in the past 12 months: No





Home Medications





- Allergies


Allergies/Adverse Reactions: 


 Allergies











Allergy/AdvReac Type Severity Reaction Status Date / Time


 


No Known Allergies Allergy   Verified 12/01/18 18:51














- Home Medications


Home Medications: 


Ambulatory Orders





Aspirin [Aspirin EC] 81 mg PO HS 12/01/18 


Metformin HCl [Glucophage] 500 mg PO BID 12/01/18 


Tamsulosin HCl [Flomax] 0.4 mg PO BID 12/01/18 


Valsartan/Hydrochlorothiazide [Valsartan-Hctz 160-25 mg Tab] 1 each PO DAILY 12/ 01/18 











Physical Exam-Neuro


Vital Signs: 


 Vital Signs











Temperature  97.8 F   12/04/18 20:24


 


Pulse Rate  58 L  12/04/18 20:24


 


Respiratory Rate  21 H  12/04/18 20:24


 


Blood Pressure  130/67   12/04/18 20:24


 


O2 Sat by Pulse Oximetry (%)  96   12/04/18 20:24











Labs: 


 CBC, BMP





 12/03/18 08:23 





 12/03/18 08:23 





 INR, PTT











INR  1.16  (0.82-1.09)   12/03/18  08:23    














Assessment/Plan


cc Episode of Passing out


HPI :78 year old male history of DM,HTN, BPH , and Lyme disease. Patient has 

episode of passing out x2 in last three days. Patient has one episode in past. 

He was about to go for stress test today and he has one more brief episode of 

passing out.


Patient has episode when he felt whole room was spinning and he did have LOC. 

He also did have one episode of vomiting.


He denies any headhace, dysphagia, dysarthria, diplopia, weakness or seizure 

like activity. there is no tonic clonic activity.


his ct head and carotid ultrasound is normal.


He was found to have renal subscapular hematoma and rib fracture.








PAST MEDICAL HISTORY:HTN, BPH, DM, 





PAST SURGICAL HISTORY:Cholecystectomy 





Social History:


Smoking:denies 


Alcohol:rarely 


Drugs:denies  





Family History:


AllergiesNKDA





No Known Allergies Allergy (Verified 12/01/18 18:51)


 








HOME MEDICATIONS:


 Home Medications











 Medication  Instructions  Recorded


 


Aspirin [Aspirin EC] 81 mg PO HS 12/01/18


 


Metformin HCl [Glucophage] 500 mg PO BID 12/01/18


 


Tamsulosin HCl [Flomax] 0.4 mg PO BID 12/01/18


 


Valsartan/Hydrochlorothiazide 1 each PO DAILY 12/01/18





[Valsartan-Hctz 160-25 mg Tab]  





FH, and ROS was reviewed in chart








NEUROLOGICAL EXAMIANTION


Alert oriented x 3, speech is normal


eomi, pupils reactive, no face asymmetry,vf normal by confrontation


face sensation and hearing isnormal


FTN, HTS is normal, no nystagmus is seen


5/5 all four ext


sensation and reflex are normal








ct head and carotid ultrasound is normal





Assessment: Recurrent episode of syncope, There is no evidence of seizure, 

stroke . ct head and carotid ultrasound is normal. There is no evidence of 

tonic clonic activity, tongue bite, incontinence or post ictal confusion 

suggestive of seizure.








Plan: no further work up including mri of brain or eeg at this point


- syncope work up as per cardiologist and pmd


- supportive care





Thanking you so much


Wayne Scott MD

## 2018-12-04 NOTE — PN
Progress Note, Physician


Chief Complaint: 





Pt A&OX3; wife is at bedside; no further dizziness; no chest pain.


History of Present Illness: 





The patient is a 78 year old male, with a significant PMH of HTN, Lyme disease (

diagnosed in August; -->4 months of antibiotics; his wife believes his evergy 

level decreased since the diagnosis) and enlarged prostate,  who presents to 

the emergency department with an episode of vertigo that occurred 2 hours ago. 

The patient states he bent down to get something in the bathroom when he 

suddenly felt like the whole room was spinning. The patient admits to losing 

consciousness for a few seconds, falling and apparently hitting right sided 

body something within the bathroom. Family found patient conscious without 

evidence of seizure activity or incontinence. He experienced 1 episode of 

vomiting after the incident. The patient states this has never happened to him 

before and is currently complaining of right sided rib pain. He mentions he 

usually takes aspirin (81 mg)  everyday but didnt take it today. The patient 

denies chest pain, shortness of breath and  headache. Denies fever, chills, 

nausea, vomit, diarrhea and constipation.


 


Allergies: NKDA





- Current Medication List


Current Medications: 


Active Medications





Acetaminophen (Tylenol -)  650 mg PO Q6H PRN


   PRN Reason: PAIN LEVEL 6-10


   Last Admin: 12/03/18 15:56 Dose:  650 mg


Docusate Sodium (Colace -)  300 mg PO [facility]


Hydrochlorothiazide (Hctz -)  25 mg PO DAILY [facility]


   Last Admin: 12/03/18 09:55 Dose:  25 mg


Insulin Aspart (Novolog Vial Sliding Scale -)  1 vial SQ [facility]; Protocol


   Last Admin: 12/04/18 11:00 Dose:  Not Given


Oxycodone HCl (Roxicodone -)  5 mg PO Q6H PRN


   PRN Reason: PAIN LEVEL 6-10


Polyethylene Glycol (Miralax (For Daily Use) -)  17 gm PO BID [facility]


Tamsulosin HCl (Flomax -)  0.4 mg PO BID@0830,2200 [facility]


   Last Admin: 12/03/18 21:21 Dose:  0.4 mg


Valsartan (Diovan -)  160 mg PO DAILY [facility]


   Last Admin: 12/03/18 09:55 Dose:  160 mg











- Objective


Vital Signs: 


 Vital Signs











Temperature  97.2 F L  12/04/18 11:57


 


Pulse Rate  54 L  12/04/18 11:57


 


Respiratory Rate  19   12/04/18 11:57


 


Blood Pressure  140/69   12/04/18 11:57


 


O2 Sat by Pulse Oximetry (%)  97   12/04/18 05:59











Constitutional: Yes: Well Nourished


Eyes: Yes: WNL


HENT: Yes: WNL


Neck: Yes: WNL


Cardiovascular: Yes: Bradycardia, S1, S2


Respiratory: Yes: WNL


Gastrointestinal: Yes: WNL


...Rectal Exam: Yes: Deferred


Genitourinary: No: Anuria


Breast(s): Yes: WNL


Musculoskeletal: Yes: WNL


Extremities: Yes: WNL


Edema: No


Peripheral Pulses WNL: Yes


Integumentary: Yes: WNL


Neurological: Yes: WNL


Psychiatric: Yes: WNL


Labs: 


 CBC, BMP





 12/03/18 08:23 





 12/03/18 08:23 





 INR, PTT











INR  1.16  (0.82-1.09)   12/03/18  08:23    








 Abnormal Lab Results











  12/05/18 12/06/18 12/06/18





  07:30 05:30 05:30


 


Monocytes %  11.0 H  11.4 H 


 


BUN    27 H


 


Random Glucose    158 H


 


Calcium    8.4 L


 


Total Protein    5.5 L


 


Albumin    3.2 L














- ....Imaging


Other: Image Reviewed (sinus bradyc)





Problem List





- Problems


(1) HTN (hypertension)


Assessment/Plan: 


On valsartan and HCTZ; lower doess today post-syncope.


Code(s): I10 - ESSENTIAL (PRIMARY) HYPERTENSION   





(2) Multiple rib fractures


Code(s): S22.49XA - MULTIPLE FRACTURES OF RIBS, UNSP SIDE, INIT FOR CLOS FX   


Qualifiers: 


   Encounter type: initial encounter   Fracture type: closed   Laterality: 

right   Qualified Code(s): S22.41XA - Multiple fractures of ribs, right side, 

initial encounter for closed fracture   





(3) Renal hematoma, right


Code(s): S37.011A - MINOR CONTUSION OF RIGHT KIDNEY, INITIAL ENCOUNTER   


Qualifiers: 


   Encounter type: initial encounter   Qualified Code(s): S37.011A - Minor 

contusion of right kidney, initial encounter   





(4) Syncope


Assessment/Plan: 


Pt has hx syncope: 1st episode 5 years ago while going to the bathroom.


2nd episode last week while brushing teeth-->fall and fractured ribs.


Today, while seated in wheelchair after receiving SestaMIBI does and having had 

rest nuclear imaging taken he felt dizzy and "burning" on the top of his head. 

He slumped in the chiar; he recovered quickly. Glucose was not low; BP WNL; 

mild bradycardia.


Plan:


Carotid artery US results pending.


ECHO: normal LVEF.


Orthostatic vital signs.


TSH.


Complete Lexiscan MIBI when stable.


Avoid dehydration (pt has been NPO since midnight while awaitng stress MIBI)

.However, in view of several episodes of syncope, the 2nd with serious 

consequences, would recommend that pt be considered for coronary angiogram and 

EP study.


Hx Lyme disease dxed earler this year after a rash was noted on right arm (

August, 2018)-->4 months of antibiotics; his wife believes his energy level 

decreased after the diagnosis.


F/u on telemetry monitroing.


Code(s): R55 - SYNCOPE AND COLLAPSE   


Qualifiers: 


   Syncope type: unspecified   Qualified Code(s): R55 - Syncope and collapse   





(5) Prostate disorder


Assessment/Plan: 


Pt has great difficulty urinating; f/u with .


Code(s): N42.9 - DISORDER OF PROSTATE, UNSPECIFIED   





(6) Diabetes


Code(s): E11.9 - TYPE 2 DIABETES MELLITUS WITHOUT COMPLICATIONS

## 2018-12-04 NOTE — PN
Physical Exam: 


SUBJECTIVE: Patient seen and examined; being transferred to [facility] from  for 

recurring syncope.  He had a rapid response while down in cardiology today that 

was tended to by the resident team.  Aparently he was in the wheelchair after 

being injected with the tracer and he slumped down and lost consciousness.  NO 

seizure activity reported, no other issues.  He doesn't recall exactly what 

happened.  VS during the episode were HR of 55 and BP of 135/80 with negative 

orthostatic VS.  





Review of his chart from  hospital hows that he has unremarkable labs from 

yesterday (labs from  still pending).  Tele has been unremarkable.  Troponin 

negative x2; lipids unremarkable.  CT scan with the R-subcapsular hematoma and 

rib fractures reviewed personally.  





Consulting Neurology per CV to r/o any neurogenic causes of syncope.








OBJECTIVE:





 Vital Signs











 Period  Temp  Pulse  Resp  BP Sys/Erickson  Pulse Ox


 


 Last 24 Hr  97.2 F-98.9 F  51-66  18-20  /52-69  96-97











GENERAL: The patient is awake, alert, and fully oriented, in no acute distress.

  Originally on NRB but transitioned off of O2 and saturating 99% on RA.


HEAD: Normal with no signs of trauma.


EYES: PERRL, extraocular movements intact, sclera anicteric, conjunctiva clear. 

No ptosis. 


ENT: Ears normal, nares patent, oropharynx clear without exudates, moist MM


NECK: Trachea midline, full range of motion, supple. 


LUNGS: Breath sounds equal, clear to auscultation bilaterally, sym exp


HEART: Regular rate and rhythm, S1, S2 without murmur, rub or gallop.


ABDOMEN: Soft, nontender, nondistended, normoactive bowel sounds, no guarding, 

no 


rebound, no hepatosplenomegaly, no masses.


EXTREMITIES: 2+ pulses, warm, well-perfused, no edema. 


NEUROLOGICAL: Cranial nerves II through XII grossly intact. Normal speech, gait 

not 


observed.


PSYCH: Normal mood, normal affect.


SKIN: Warm, dry, normal turgor, no rashes or lesions noted














 Laboratory Results - last 24 hr











  12/04/18 12/04/18





  06:05 10:39


 


POC Glucometer  142  178








Active Medications











Generic Name Dose Route Start Last Admin





  Trade Name Freq  PRN Reason Stop Dose Admin


 


Acetaminophen  650 mg  12/03/18 15:10  12/03/18 15:56





  Tylenol -  PO   650 mg





  Q6H PRN   Administration





  PAIN LEVEL 6-10   





     





     





     


 


Docusate Sodium  300 mg  12/04/18 22:00  





  Colace -  PO   





  HS JYOTI   





     





     





     





     


 


Hydrochlorothiazide  25 mg  12/02/18 10:00  12/03/18 09:55





  Hctz -  PO   25 mg





  DAILY JYOTI   Administration





     





     





     





     


 


Insulin Aspart  1 vial  12/02/18 07:00  12/04/18 06:12





  Novolog Vial Sliding Scale -  SQ   Not Given





  [facility]   





     





     





  Protocol   





     


 


Oxycodone HCl  5 mg  12/03/18 16:59  





  Roxicodone -  PO   





  Q6H PRN   





  PAIN LEVEL 6-10   





     





     





     


 


Polyethylene Glycol  17 gm  12/04/18 22:00  





  Miralax (For Daily Use) -  PO   





  BID JYOTI   





     





     





     





     


 


Tamsulosin HCl  0.4 mg  12/02/18 08:30  12/03/18 21:21





  Flomax -  PO   0.4 mg





  BID@0830,2200 JYOTI   Administration





     





     





     





     


 


Valsartan  160 mg  12/02/18 10:00  12/03/18 09:55





  Diovan -  PO   160 mg





  DAILY JYOTI   Administration





     





     





     





     











ASSESSMENT/PLAN:


Being formally transferred to [facility] for syncope after a Rapid Response down in 

Cardiology.  Having the first part of his stress test done today.  CV following

, involving neurology.  Hemodynamics stable; labs pending





1) Syncope


-Recurring; cardiology and neurology following.  Awaiting their input and 

appreciate their presence on case.  Cardiology is reviewing EKG taken at the 

[facility]; I didn't see it personally but RR team did assess before giving to CV; 

will follow this test up.


-HR low 50s could be contributory.  Further neuro workup pending their service 

recs; will defer further orders to them


-Monitor on telemetry


-Echo, duplex reviewed.  Followup on stress test.  Check labs including 

troponin.


-Fall precautions





2) Hematuria 2/2 R-subcapsular hematoma


-Trend CBC; stable.  Pain controlled.  Due to fall 2/2 syncope





3) Rib Fractures


-Ribs 9-10 displaced, 11 non-displaced.


-No s/s PTX; pain controlled.  Incentive spirometry





4) BPH


-Continue tamsulosin





5) HTN


-Negative orthostatics; continue meds as scheduled without changes





6) H/O Lyme Disease


-No new issues





7) Borderline bradycardia


-Trending in low 50s; manage per CV.  Assess for chronotropic response.





Full Code








Visit type





- Emergency Visit


Emergency Visit: No





- New Patient


This patient is new to me today: No





- Critical Care


Critical Care patient: No

## 2018-12-04 NOTE — PN
Physical Exam: 


SUBJECTIVE: Patient seen and examined








OBJECTIVE:





 Vital Signs











 Period  Temp  Pulse  Resp  BP Sys/Erickson  Pulse Ox


 


 Last 24 Hr  97.7 F-98.9 F  51-66  18-20  /52-66  96-97











GENERAL: The patient is awake, alert, and fully oriented, in no acute distress.


HEAD: Normal with no signs of trauma.


EYES: PERRL, extraocular movements intact, sclera anicteric, conjunctiva clear. 

No ptosis. 


ENT: Ears normal, nares patent, oropharynx clear without exudates, moist mucous 


membranes.


NECK: Trachea midline, full range of motion, supple. 


LUNGS: Breath sounds equal, clear to auscultation bilaterally, no wheezes, no 

crackles, no 


accessory muscle use. 


HEART: Regular rate and rhythm, S1, S2 without murmur, rub or gallop.


ABDOMEN: Soft, nontender, nondistended, normoactive bowel sounds, no guarding, 

no 


rebound, no hepatosplenomegaly, no masses.


EXTREMITIES: 2+ pulses, warm, well-perfused, no edema. 


NEUROLOGICAL: Cranial nerves II through XII grossly intact. Normal speech, gait 

not 


observed.


PSYCH: Normal mood, normal affect.


SKIN: Warm, dry, normal turgor, no rashes or lesions noted














 Laboratory Results - last 24 hr











  12/02/18 12/03/18 12/03/18





  06:45 08:00 08:05


 


WBC   


 


RBC   


 


Hgb   


 


Hct   


 


MCV   


 


MCH   


 


MCHC   


 


RDW   


 


Plt Count   


 


MPV   


 


Absolute Neuts (auto)   


 


Neutrophils %   


 


Lymphocytes %   


 


Monocytes %   


 


Eosinophils %   


 


Basophils %   


 


PT with INR   


 


INR   


 


PTT (Actin FS)   


 


Sodium   


 


Potassium   


 


Chloride   


 


Carbon Dioxide   


 


Anion Gap   


 


BUN   


 


Creatinine   


 


Creat Clearance w eGFR   


 


POC Glucometer   


 


Random Glucose   


 


Calcium   


 


Magnesium   


 


Troponin I   < 0.03 


 


Triglycerides   


 


Cholesterol   


 


Total LDL Cholesterol   


 


HDL Cholesterol   


 


Urine Color  Yellow  


 


Urine Appearance  Clear  


 


Urine pH  5.5  


 


Ur Specific Gravity  1.015  


 


Urine Protein  Negative  


 


Urine Glucose (UA)  Negative  


 


Urine Ketones  Negative  


 


Urine Blood  3+ H  


 


Urine Nitrite  Negative  


 


Urine Bilirubin  Negative  


 


Urine Urobilinogen  0.2  


 


Ur Leukocyte Esterase  Negative  


 


Urine RBC  5-10  


 


Urine WBC  0-3  


 


Ur Epithelial Cells  Few  


 


Urine Bacteria  None seen  


 


Blood Type    O POSITIVE


 


Antibody Screen    Negative














  12/03/18 12/03/18 12/03/18





  08:10 08:23 08:23


 


WBC   7.1 


 


RBC   4.49 


 


Hgb   13.7 


 


Hct   40.8 


 


MCV   90.9 


 


MCH   30.6 


 


MCHC   33.7 


 


RDW   13.1 


 


Plt Count   166 


 


MPV   9.1 


 


Absolute Neuts (auto)   4.9 


 


Neutrophils %   68.2 


 


Lymphocytes %   20.6  D 


 


Monocytes %   9.1 


 


Eosinophils %   1.8 


 


Basophils %   0.3 


 


PT with INR   


 


INR   


 


PTT (Actin FS)   


 


Sodium    134 L


 


Potassium    3.8


 


Chloride    102


 


Carbon Dioxide    26


 


Anion Gap    6 L


 


BUN    17


 


Creatinine    0.8


 


Creat Clearance w eGFR    > 60


 


POC Glucometer   


 


Random Glucose    159 H D


 


Calcium    8.9


 


Magnesium    1.9


 


Troponin I   


 


Triglycerides   


 


Cholesterol   


 


Total LDL Cholesterol   


 


HDL Cholesterol   


 


Urine Color   


 


Urine Appearance   


 


Urine pH   


 


Ur Specific Gravity   


 


Urine Protein   


 


Urine Glucose (UA)   


 


Urine Ketones   


 


Urine Blood   


 


Urine Nitrite   


 


Urine Bilirubin   


 


Urine Urobilinogen   


 


Ur Leukocyte Esterase   


 


Urine RBC   


 


Urine WBC   


 


Ur Epithelial Cells   


 


Urine Bacteria   


 


Blood Type  O POSITIVE  


 


Antibody Screen   














  12/03/18 12/03/18 12/04/18





  08:23 08:23 06:05


 


WBC   


 


RBC   


 


Hgb   


 


Hct   


 


MCV   


 


MCH   


 


MCHC   


 


RDW   


 


Plt Count   


 


MPV   


 


Absolute Neuts (auto)   


 


Neutrophils %   


 


Lymphocytes %   


 


Monocytes %   


 


Eosinophils %   


 


Basophils %   


 


PT with INR   13.0 


 


INR   1.16 


 


PTT (Actin FS)   26.3 


 


Sodium   


 


Potassium   


 


Chloride   


 


Carbon Dioxide   


 


Anion Gap   


 


BUN   


 


Creatinine   


 


Creat Clearance w eGFR   


 


POC Glucometer    142


 


Random Glucose   


 


Calcium   


 


Magnesium   


 


Troponin I   


 


Triglycerides  73  


 


Cholesterol  142  


 


Total LDL Cholesterol  71  


 


HDL Cholesterol  56  


 


Urine Color   


 


Urine Appearance   


 


Urine pH   


 


Ur Specific Gravity   


 


Urine Protein   


 


Urine Glucose (UA)   


 


Urine Ketones   


 


Urine Blood   


 


Urine Nitrite   


 


Urine Bilirubin   


 


Urine Urobilinogen   


 


Ur Leukocyte Esterase   


 


Urine RBC   


 


Urine WBC   


 


Ur Epithelial Cells   


 


Urine Bacteria   


 


Blood Type   


 


Antibody Screen   








Active Medications











Generic Name Dose Route Start Last Admin





  Trade Name Freq  PRN Reason Stop Dose Admin


 


Acetaminophen  650 mg  12/03/18 15:10  12/03/18 15:56





  Tylenol -  PO   650 mg





  Q6H PRN   Administration





  PAIN LEVEL 6-10   





     





     





     


 


Hydrochlorothiazide  25 mg  12/02/18 10:00  12/03/18 09:55





  Hctz -  PO   25 mg





  DAILY [facility]   Administration





     





     





     





     


 


Insulin Aspart  1 vial  12/02/18 07:00  12/04/18 06:12





  Novolog Vial Sliding Scale -  SQ   Not Given





  [facility]   





     





     





  Protocol   





     


 


Oxycodone HCl  5 mg  12/03/18 16:59  





  Roxicodone -  PO   





  Q6H PRN   





  PAIN LEVEL 6-10   





     





     





     


 


Tamsulosin HCl  0.4 mg  12/02/18 08:30  12/03/18 21:21





  Flomax -  PO   0.4 mg





  BID@0830,2200 JYOTI   Administration





     





     





     





     


 


Valsartan  160 mg  12/02/18 10:00  12/03/18 09:55





  Diovan -  PO   160 mg





  DAILY [facility]   Administration





     





     





     





     











ASSESSMENT/PLAN:

## 2018-12-05 LAB
ALBUMIN SERPL-MCNC: 3.4 G/DL (ref 3.4–5)
ALP SERPL-CCNC: 75 U/L (ref 45–117)
ALT SERPL-CCNC: 30 U/L (ref 13–61)
ANION GAP SERPL CALC-SCNC: 7 MMOL/L (ref 8–16)
AST SERPL-CCNC: 20 U/L (ref 15–37)
BASOPHILS # BLD: 0.5 % (ref 0–2)
BILIRUB SERPL-MCNC: 1.2 MG/DL (ref 0.2–1)
BNP SERPL-MCNC: 23.3 PG/ML (ref 5–450)
BUN SERPL-MCNC: 23 MG/DL (ref 7–18)
CALCIUM SERPL-MCNC: 8.5 MG/DL (ref 8.5–10.1)
CHLORIDE SERPL-SCNC: 107 MMOL/L (ref 98–107)
CO2 SERPL-SCNC: 28 MMOL/L (ref 21–32)
CREAT SERPL-MCNC: 0.8 MG/DL (ref 0.55–1.3)
DEPRECATED RDW RBC AUTO: 14 % (ref 11.9–15.9)
EOSINOPHIL # BLD: 1.8 % (ref 0–4.5)
GLUCOSE SERPL-MCNC: 147 MG/DL (ref 74–106)
HCT VFR BLD CALC: 39.4 % (ref 35.4–49)
HGB BLD-MCNC: 13.4 GM/DL (ref 11.7–16.9)
LYMPHOCYTES # BLD: 19.8 % (ref 8–40)
MAGNESIUM SERPL-MCNC: 2 MG/DL (ref 1.8–2.4)
MCH RBC QN AUTO: 29.5 PG (ref 25.7–33.7)
MCHC RBC AUTO-ENTMCNC: 34 G/DL (ref 32–35.9)
MCV RBC: 86.8 FL (ref 80–96)
MONOCYTES # BLD AUTO: 11 % (ref 3.8–10.2)
NEUTROPHILS # BLD: 66.9 % (ref 42.8–82.8)
PLATELET # BLD AUTO: 165 K/MM3 (ref 134–434)
PMV BLD: 9.1 FL (ref 7.5–11.1)
POTASSIUM SERPLBLD-SCNC: 3.9 MMOL/L (ref 3.5–5.1)
PROT SERPL-MCNC: 5.9 G/DL (ref 6.4–8.2)
RBC # BLD AUTO: 4.54 M/MM3 (ref 4–5.6)
SODIUM SERPL-SCNC: 142 MMOL/L (ref 136–145)
WBC # BLD AUTO: 6.2 K/MM3 (ref 4–10)

## 2018-12-05 RX ADMIN — INSULIN ASPART SCH: 100 INJECTION, SOLUTION INTRAVENOUS; SUBCUTANEOUS at 11:44

## 2018-12-05 RX ADMIN — POLYETHYLENE GLYCOL 3350 SCH GRAMS: 17 POWDER, FOR SOLUTION ORAL at 21:25

## 2018-12-05 RX ADMIN — VALSARTAN SCH: 160 TABLET, FILM COATED ORAL at 09:43

## 2018-12-05 RX ADMIN — INSULIN ASPART SCH: 100 INJECTION, SOLUTION INTRAVENOUS; SUBCUTANEOUS at 21:25

## 2018-12-05 RX ADMIN — INSULIN ASPART SCH: 100 INJECTION, SOLUTION INTRAVENOUS; SUBCUTANEOUS at 17:41

## 2018-12-05 RX ADMIN — TAMSULOSIN HYDROCHLORIDE SCH: 0.4 CAPSULE ORAL at 08:58

## 2018-12-05 RX ADMIN — POLYETHYLENE GLYCOL 3350 SCH GRAMS: 17 POWDER, FOR SOLUTION ORAL at 13:25

## 2018-12-05 RX ADMIN — INSULIN ASPART SCH: 100 INJECTION, SOLUTION INTRAVENOUS; SUBCUTANEOUS at 06:39

## 2018-12-05 RX ADMIN — TAMSULOSIN HYDROCHLORIDE SCH MG: 0.4 CAPSULE ORAL at 21:25

## 2018-12-05 RX ADMIN — POLYETHYLENE GLYCOL 3350 SCH: 17 POWDER, FOR SOLUTION ORAL at 09:43

## 2018-12-05 RX ADMIN — VALSARTAN SCH MG: 160 TABLET, FILM COATED ORAL at 13:25

## 2018-12-05 RX ADMIN — SODIUM CHLORIDE, POTASSIUM CHLORIDE, SODIUM LACTATE AND CALCIUM CHLORIDE SCH MLS/HR: 600; 310; 30; 20 INJECTION, SOLUTION INTRAVENOUS at 13:09

## 2018-12-05 RX ADMIN — DOCUSATE SODIUM SCH MG: 100 CAPSULE, LIQUID FILLED ORAL at 21:24

## 2018-12-05 RX ADMIN — HYDROCHLOROTHIAZIDE SCH: 25 TABLET ORAL at 09:43

## 2018-12-05 NOTE — PN
Progress Note, Physician


History of Present Illness: 





The patient is a 78 year old male, with a significant PMH of HTN, Lyme disease (

diagnosed in August) and enlarged prostate,  who presents to the emergency 

department with an episode of vertigo that occurred 2 hours ago. The patient 

states he bent down to get something in the bathroom when he suddenly felt like 

the whole room was spinning. The patient admits to losing consciousness for a 

few seconds, falling and apparently hitting right sided body something within 

the bathroom. Family found patient conscious without evidence of seizure 

activity or incontinence. He experienced 1 episode of vomiting after the 

incident. The patient states this has never happened to him before and is 

currently complaining of right sided rib pain. He mentions he usually takes 

aspirin (81 mg)  everyday but didnt take it today. The patient denies chest 

pain, shortness of breath and  headache. Denies fever, chills, nausea, vomit, 

diarrhea and constipation.


 


Allergies: NKDA


Past surgical history: None reported


Social history: None reported


PCP: None reported 








- Current Medication List


Current Medications: 


Active Medications





Acetaminophen (Tylenol -)  650 mg PO Q6H PRN


   PRN Reason: PAIN LEVEL 6-10


   Last Admin: 12/03/18 15:56 Dose:  650 mg


Docusate Sodium (Colace -)  300 mg PO HS Pending sale to [facility]


   Last Admin: 12/04/18 21:24 Dose:  300 mg


Hydrochlorothiazide (Hctz -)  25 mg PO DAILY Pending [facility] to [facility]


   Last Admin: 12/05/18 09:43 Dose:  Not Given


Lactated Ringer's (Lactated Ringers Solution)  1,000 ml in 1,000 mls @ 75 mls/

hr IV ASDIR Pending sale to [facility]


Insulin Aspart (Novolog Vial Sliding Scale -)  1 vial SQ [facility] Pending sale to [facility]; Protocol


   Last Admin: 12/05/18 06:39 Dose:  Not Given


Oxycodone HCl (Roxicodone -)  5 mg PO Q6H PRN


   PRN Reason: PAIN LEVEL 6-10


Polyethylene Glycol (Miralax (For Daily Use) -)  17 gm PO BID Pending [facility] to [facility]


   Last Admin: 12/05/18 09:43 Dose:  Not Given


Tamsulosin HCl (Flomax -)  0.4 mg PO BID@0830,2200 Pending sale to [facility]


   Last Admin: 12/05/18 08:58 Dose:  Not Given


Valsartan (Diovan -)  160 mg PO DAILY Pending [facility] to [facility]


   Last Admin: 12/05/18 09:43 Dose:  Not Given











- Objective


Vital Signs: 


 Vital Signs











Temperature  97.7 F   12/05/18 09:30


 


Pulse Rate  63   12/05/18 09:30


 


Respiratory Rate  20   12/05/18 09:30


 


Blood Pressure  124/67   12/05/18 09:30


 


O2 Sat by Pulse Oximetry (%)  97   12/05/18 08:08











Eyes: Yes: WNL, Conjunctiva Clear, EOM Intact


HENT: Yes: WNL, Atraumatic, Normocephalic


Neck: Yes: WNL, Supple, Trachea Midline


Cardiovascular: Yes: WNL, Regular Rate and Rhythm


Respiratory: Yes: WNL, Regular, CTA Bilaterally


Gastrointestinal: Yes: WNL, Normal Bowel Sounds


Genitourinary: Yes: WNL


Musculoskeletal: Yes: WNL


Extremities: Yes: WNL


Edema: No


Integumentary: Yes: WNL


Neurological: Yes: WNL, Alert, Oriented


...Motor Strength: WNL


Psychiatric: Yes: WNL


Labs: 


 CBC, BMP





 12/05/18 07:30 





 12/05/18 07:30 





 INR, PTT











INR  1.16  (0.82-1.09)   12/03/18  08:23    














Problem List





- Problems


(1) Multiple rib fractures


Code(s): S22.49XA - MULTIPLE FRACTURES OF RIBS, UNSP SIDE, INIT FOR CLOS FX   


Qualifiers: 


   Encounter type: initial encounter   Fracture type: closed   Laterality: 

right   Qualified Code(s): S22.41XA - Multiple fractures of ribs, right side, 

initial encounter for closed fracture   





(2) Renal hematoma, right


Code(s): S37.011A - MINOR CONTUSION OF RIGHT KIDNEY, INITIAL ENCOUNTER   


Qualifiers: 


   Encounter type: initial encounter   Qualified Code(s): S37.011A - Minor 

contusion of right kidney, initial encounter   





(3) Syncope


Code(s): R55 - SYNCOPE AND COLLAPSE   


Qualifiers: 


   Syncope type: unspecified   Qualified Code(s): R55 - Syncope and collapse   





Assessment/Plan








- Problems


(1) HTN (hypertension)


Assessment/Plan: 


On valsartan and HCTZ; lower doess today post-syncope.


Code(s): I10 - ESSENTIAL (PRIMARY) HYPERTENSION   





(2) Multiple rib fractures


Code(s): S22.49XA - MULTIPLE FRACTURES OF RIBS, UNSP SIDE, INIT FOR CLOS FX   


Qualifiers: 


   Encounter type: initial encounter   Fracture type: closed   Laterality: 

right   Qualified Code(s): S22.41XA - Multiple fractures of ribs, right side, 

initial encounter for closed fracture   





(3) Renal hematoma, right


Code(s): S37.011A - MINOR CONTUSION OF RIGHT KIDNEY, INITIAL ENCOUNTER   


Qualifiers: 


   Encounter type: initial encounter   Qualified Code(s): S37.011A - Minor 

contusion of right kidney, initial encounter   





(4) Syncope


Assessment/Plan: 


Pt has hx syncope: 1st episode 5 years ago while going to the bathroom.


2nd episode last week while brushing teeth-->fall and fractured ribs renal 

hematoma


3rd while seated in wheelchair after receiving SestaMIBI does and having had 

rest nuclear imaging taken he felt dizzy and "burning" on the top of his head. 

He slumped in the chiar; he recovered quickly. Glucose was not low; BP WNL; 

mild bradycardia.


Carotid artery US no stenosis.


ECHO: normal LVEF.


Resting MIBI post lateral defect -moderate size moderate intensity


Neuro - no neuro w/u needed , no neurologic couse of syncope - as per neuro 

consult





taking in to consideration significant syncope - rib fx, renal hematoma and 

syncope while sitting as well as baseline bradycardia. d/w wife and the patient 

optimal rx course. Family reluctant to proceed with stress test due to fear of 

additional syncope.


Will transfer for c. cath and EPS at [facility].


Code(s): R55 - SYNCOPE AND COLLAPSE   


Qualifiers: 


   Syncope type: unspecified   Qualified Code(s): R55 - Syncope and collapse   





(5) Prostate disorder


Assessment/Plan: 


Pt has great difficulty urinating; f/u with .


Code(s): N42.9 - DISORDER OF PROSTATE, UNSPECIFIED   





(6) Diabetes


Code(s): E11.9 - TYPE 2 DIABETES MELLITUS WITHOUT COMPLICATIONS

## 2018-12-05 NOTE — PN
Physical Exam: 


SUBJECTIVE: Patient seen and examined; no events reported to me from overnight. 

Remains afebrile and hemodynamically stable.  No further syncope.


Labs were drawn during RR but are not posting under his chart; were ordered by 

resident team; will investigate.  This monring labs pending


TSH wnl


Initial part of the MIBI shows that he has posterior lateral defect that is 

moderate sized/moderate intensity.  Discussed with his cardiologist.  Given his 

significant syncope and risk factors he will be transferred to [facility] tomorrow AM for cath and EP study.  Transfer being 

coordinated by CV.





10 sys ROS done and negative aside from HPI








OBJECTIVE:





 Vital Signs











 Period  Temp  Pulse  Resp  BP Sys/Erickson  Pulse Ox


 


 Last 24 Hr  97.2 F-99.2 F  51-63  16-21  110-140/59-80  95-96











GENERAL: The patient is awake, alert, and fully oriented, in no acute distress.


HEAD: Normal with no signs of trauma.


EYES: PERRL, extraocular movements intact, sclera anicteric, conjunctiva clear. 

No ptosis. 


ENT: Ears normal, nares patent, oropharynx clear without exudates, moist mucous 


membranes.


NECK: Trachea midline, full range of motion, supple. 


LUNGS: Breath sounds equal, clear to auscultation bilaterally, no wheezes


HEART: Regular rate and rhythm, S1, S2 without murmur, rub or gallop.


ABDOMEN: Soft, nontender, nondistended, normoactive bowel sounds, no guarding, 

no 


rebound, no hepatosplenomegaly, no masses.


EXTREMITIES: 2+ pulses, warm, well-perfused, no edema. 


NEUROLOGICAL: Cranial nerves II through XII grossly intact.


PSYCH: Normal mood, normal affect.


SKIN: Warm, dry, normal turgor, no rashes or lesions noted














 Laboratory Results - last 24 hr











  12/04/18 12/04/18 12/04/18





  10:39 17:05 17:50


 


POC Glucometer  178  [phone] 


 


TSH    1.35














  12/04/18 12/05/18





  21:24 05:50


 


POC Glucometer  181  153


 


TSH  








Active Medications











Generic Name Dose Route Start Last Admin





  Trade Name Freq  PRN Reason Stop Dose Admin


 


Acetaminophen  650 mg  12/03/18 15:10  12/03/18 15:56





  Tylenol -  PO   650 mg





  Q6H PRN   Administration





  PAIN LEVEL 6-10   





     





     





     


 


Docusate Sodium  300 mg  12/04/18 22:00  12/04/18 21:24





  Colace -  PO   300 mg





  HS JYOTI   Administration





     





     





     





     


 


Hydrochlorothiazide  25 mg  12/02/18 10:00  12/04/18 11:30





  Hctz -  PO   25 mg





  DAILY JYOTI   Administration





     





     





     





     


 


Insulin Aspart  1 vial  12/02/18 07:00  12/05/18 06:39





  Novolog Vial Sliding Scale -  SQ   Not Given





  [facility]   





     





     





  Protocol   





     


 


Lactated Ringer's  60 ml  12/05/18 07:17  





  Lactated Ringers Solution  IV  12/05/18 07:18  





  NOW ONE   





     





     





     





     


 


Oxycodone HCl  5 mg  12/03/18 16:59  





  Roxicodone -  PO   





  Q6H PRN   





  PAIN LEVEL 6-10   





     





     





     


 


Polyethylene Glycol  17 gm  12/04/18 22:00  12/04/18 21:30





  Miralax (For Daily Use) -  PO   17 grams





  BID JYOTI   Administration





     





     





     





     


 


Tamsulosin HCl  0.4 mg  12/02/18 08:30  12/04/18 21:25





  Flomax -  PO   0.4 mg





  BID@0830,2200 JYOTI   Administration





     





     





     





     


 


Valsartan  160 mg  12/02/18 10:00  12/04/18 16:23





  Diovan -  PO   Not Given





  DAILY JYOTI   





     





     





     





     











ASSESSMENT/PLAN:





1) Syncope, severe and recurring


-Recurring; cardiology and neurology following.  Awaiting their input and 

appreciate their presence on case.  


-No recurring CP; MIBI shows on resting portion (other portion not completed) a 

posteriorlateral defect moderate sie moderate intensity. Given risk factors and 

overall presentation he is being transferred to [facility] for further tx 

and monitoring.  


-Neurology consult noted; appreciate their input


-Continuing to monitor on telemetry


-Echo, duplex reviewed. 


-Fall precautions





2) Hematuria 2/2 R-subcapsular hematoma


-Trend CBC; stable.  Pain controlled.  Due to fall 2/2 syncope


-Trend CBC; has been stable.  OP followup recommended. 





3) Rib Fractures


-Ribs 9-10 displaced, 11 non-displaced.


-No s/s PTX; pain controlled.  Incentive spirometry





4) BPH


-Continue tamsulosin





5) HTN


-Negative orthostatics; continue meds as scheduled without changes





6) H/O Lyme Disease


-No new issues





7) Borderline bradycardia


-Trending in low 50s; manage per CV.  Does show chronotropic response.  asx.





Full Code








Visit type





- Emergency Visit


Emergency Visit: No





- New Patient


This patient is new to me today: No





- Critical Care


Critical Care patient: No

## 2018-12-05 NOTE — EKG
Test Reason : 

Blood Pressure : ***/*** mmHG

Vent. Rate : 055 BPM     Atrial Rate : 055 BPM

   P-R Int : 160 ms          QRS Dur : 100 ms

    QT Int : 444 ms       P-R-T Axes : 020 047 025 degrees

   QTc Int : 424 ms

 

SINUS BRADYCARDIA

OTHERWISE NORMAL ECG

WHEN COMPARED WITH ECG OF 04-DEC-2018 10:45,

NO SIGNIFICANT CHANGE WAS FOUND

Confirmed by JASPER TOLEDO MD ([id number]) on 12/5/2018 3:46:42 PM

 

Referred By: Physician Emergency Dept           Confirmed By:JASPER TOLEDO MD

## 2018-12-06 VITALS — HEART RATE: 64 BPM | TEMPERATURE: 99.1 F | SYSTOLIC BLOOD PRESSURE: 122 MMHG | DIASTOLIC BLOOD PRESSURE: 65 MMHG

## 2018-12-06 LAB
ALBUMIN SERPL-MCNC: 3.2 G/DL (ref 3.4–5)
ALP SERPL-CCNC: 78 U/L (ref 45–117)
ALT SERPL-CCNC: 34 U/L (ref 13–61)
ANION GAP SERPL CALC-SCNC: 9 MMOL/L (ref 8–16)
AST SERPL-CCNC: 22 U/L (ref 15–37)
BASOPHILS # BLD: 0.4 % (ref 0–2)
BILIRUB SERPL-MCNC: 1 MG/DL (ref 0.2–1)
BUN SERPL-MCNC: 27 MG/DL (ref 7–18)
CALCIUM SERPL-MCNC: 8.4 MG/DL (ref 8.5–10.1)
CHLORIDE SERPL-SCNC: 101 MMOL/L (ref 98–107)
CO2 SERPL-SCNC: 29 MMOL/L (ref 21–32)
CREAT SERPL-MCNC: 0.8 MG/DL (ref 0.55–1.3)
DEPRECATED RDW RBC AUTO: 13.8 % (ref 11.9–15.9)
EOSINOPHIL # BLD: 2.3 % (ref 0–4.5)
GLUCOSE SERPL-MCNC: 158 MG/DL (ref 74–106)
HCT VFR BLD CALC: 37.5 % (ref 35.4–49)
HGB BLD-MCNC: 12.9 GM/DL (ref 11.7–16.9)
LYMPHOCYTES # BLD: 17.1 % (ref 8–40)
MAGNESIUM SERPL-MCNC: 2 MG/DL (ref 1.8–2.4)
MCH RBC QN AUTO: 29.9 PG (ref 25.7–33.7)
MCHC RBC AUTO-ENTMCNC: 34.3 G/DL (ref 32–35.9)
MCV RBC: 87.2 FL (ref 80–96)
MONOCYTES # BLD AUTO: 11.4 % (ref 3.8–10.2)
NEUTROPHILS # BLD: 68.8 % (ref 42.8–82.8)
PLATELET # BLD AUTO: 168 K/MM3 (ref 134–434)
PMV BLD: 9 FL (ref 7.5–11.1)
POTASSIUM SERPLBLD-SCNC: 3.8 MMOL/L (ref 3.5–5.1)
PROT SERPL-MCNC: 5.5 G/DL (ref 6.4–8.2)
RBC # BLD AUTO: 4.31 M/MM3 (ref 4–5.6)
SODIUM SERPL-SCNC: 139 MMOL/L (ref 136–145)
WBC # BLD AUTO: 6.4 K/MM3 (ref 4–10)

## 2018-12-06 RX ADMIN — INSULIN ASPART SCH: 100 INJECTION, SOLUTION INTRAVENOUS; SUBCUTANEOUS at 12:48

## 2018-12-06 RX ADMIN — TAMSULOSIN HYDROCHLORIDE SCH MG: 0.4 CAPSULE ORAL at 10:38

## 2018-12-06 RX ADMIN — SODIUM CHLORIDE, POTASSIUM CHLORIDE, SODIUM LACTATE AND CALCIUM CHLORIDE SCH MLS/HR: 600; 310; 30; 20 INJECTION, SOLUTION INTRAVENOUS at 10:38

## 2018-12-06 RX ADMIN — INSULIN ASPART SCH: 100 INJECTION, SOLUTION INTRAVENOUS; SUBCUTANEOUS at 06:17

## 2018-12-06 RX ADMIN — VALSARTAN SCH MG: 160 TABLET, FILM COATED ORAL at 10:38

## 2018-12-06 RX ADMIN — POLYETHYLENE GLYCOL 3350 SCH GRAMS: 17 POWDER, FOR SOLUTION ORAL at 10:38

## 2018-12-06 NOTE — DS
Physical Exam: 


SUBJECTIVE: Patient seen and examined; being transferred to [facility] for 

cath and EP study.  No issues reported to me from overnight; remains afebrile 

and hemodynamically stable.  In good spirits.  No further syncope or CP.








OBJECTIVE:





 Vital Signs











 Period  Temp  Pulse  Resp  BP Sys/Erickson  Pulse Ox


 


 Last 24 Hr  97.4 F-98.6 F  54-66  17-20  118-142/63-69  95-96








PHYSICAL EXAM





GENERAL: The patient is awake, alert, and fully oriented, in no acute distress.


HEAD: Normal with no signs of trauma.


EYES: PERRL, extraocular movements intact, sclera anicteric, conjunctiva clear. 


ENT: Ears normal, nares patent, oropharynx clear without exudates, moist mucous 

membranes.


NECK: Trachea midline, full range of motion, supple. 


LUNGS: Breath sounds equal, clear to auscultation bilaterally, no wheezes, no 

crackles, no accessory muscle use. 


HEART: Regular rate and rhythm, S1, S2 without murmur, rub or gallop.


ABDOMEN: Soft, nontender, nondistended, normoactive bowel sounds


EXTREMITIES: 2+ pulses, warm, well-perfused, no edema. 


NEUROLOGICAL: Cranial nerves II through XII grossly intact. Normal speech, gait 

not observed.


PSYCH: Normal mood, normal affect.


SKIN: Warm, dry, normal turgor, no rashes or lesions noted.





LABS


 Laboratory Results - last 24 hr











  12/05/18 12/05/18 12/05/18





  07:30 07:30 17:43


 


WBC   6.2 


 


RBC   4.54 


 


Hgb   13.4 


 


Hct   39.4 


 


MCV   86.8 


 


MCH   29.5 


 


MCHC   34.0 


 


RDW   14.0 


 


Plt Count   165 


 


MPV   9.1 


 


Absolute Neuts (auto)   4.2 


 


Neutrophils %   66.9 


 


Lymphocytes %   19.8 


 


Monocytes %   11.0 H 


 


Eosinophils %   1.8 


 


Basophils %   0.5 


 


Nucleated RBC %   0 


 


Sodium  142  


 


Potassium  3.9  


 


Chloride  107  


 


Carbon Dioxide  28  


 


Anion Gap  7 L  


 


BUN  23 H  


 


Creatinine  0.8  


 


Creat Clearance w eGFR  > 60  


 


POC Glucometer    189


 


Random Glucose  147 H  


 


Calcium  8.5  


 


Magnesium  2.0  


 


Total Bilirubin  1.2 H  


 


AST  20  


 


ALT  30  


 


Alkaline Phosphatase  75  


 


Troponin I  < 0.02  


 


B-Natriuretic Peptide  23.3  


 


Total Protein  5.9 L  


 


Albumin  3.4  














  12/05/18 12/06/18 12/06/18





  21:24 05:30 05:40


 


WBC   


 


RBC   


 


Hgb   


 


Hct   


 


MCV   


 


MCH   


 


MCHC   


 


RDW   


 


Plt Count   


 


MPV   


 


Absolute Neuts (auto)   


 


Neutrophils %   


 


Lymphocytes %   


 


Monocytes %   


 


Eosinophils %   


 


Basophils %   


 


Nucleated RBC %   


 


Sodium   139 


 


Potassium   3.8 


 


Chloride   101 


 


Carbon Dioxide   29 


 


Anion Gap   9 


 


BUN   27 H 


 


Creatinine   0.8 


 


Creat Clearance w eGFR   > 60 


 


POC Glucometer  198   165


 


Random Glucose   158 H 


 


Calcium   8.4 L 


 


Magnesium   2.0 


 


Total Bilirubin   1.0 


 


AST   22 


 


ALT   34 


 


Alkaline Phosphatase   78 


 


Troponin I   


 


B-Natriuretic Peptide   


 


Total Protein   5.5 L 


 


Albumin   3.2 L 











HOSPITAL COURSE:





Date of Admission:12/02/18





Date of Discharge: 12/06/18








1) Syncope, severe and recurring


*Presented with severe syncope leading to fall causing rib fractures; this is 

recurring issue.  Due to this prompted admission.


-Neurology saw: clear from neuro perspective and recommended further CV workup


-Echo, carotid doppler normal.


-CV saw; patient is being transferred to [facility] for cath and EP 

testing.


-No recurring CP; MIBI shows on resting portion (other portion not completed) a 

posteriorlateral defect moderate sie moderate intensity. 


-Neurology consult noted; appreciate their input


-Continuing to monitor on telemetry; no AVB seen.  Low grade bradycardia 

trending in 50s with chronotropic tresponse.


-Fall precautions





2) Hematuria 2/2 R-subcapsular hematoma


-Trend CBC; stable.  Pain controlled.  Due to fall 2/2 syncope


-Trend CBC; has been stable.  OP followup recommended. No further issues from 

admission





3) Rib Fractures


-Ribs 9-10 displaced, 11 non-displaced.


-No s/s PTX; pain controlled.  Incentive spirometry





4) BPH


-Continue tamsulosin





5) HTN


-Negative orthostatics; continue meds as scheduled without changes





6) H/O Lyme Disease


-No new issues





7) Borderline bradycardia


-Trending in low 50s; manage per CV.  Does show chronotropic response.  asx.





Full Code


Minutes to complete discharge: 45





Discharge Summary


Reason For Visit: FX. RIBS, HEMATOMA RIGHT KIDNEY.


Current Active Problems





Diabetes (Acute)


HTN (hypertension) (Acute)


Hematuria (Acute)


Multiple rib fractures (Acute)


Prostate disorder (Acute)


Renal hematoma, right (Acute)


Syncope (Acute)








Condition: Stable





- Instructions


Diet, Activity, Other Instructions: 


NPO after transfer for afternoon cath and EP study


Resume diet post cath


Further instructions per accepting facility


Disposition: TRANSFER ACUTE CARE/OTHER HOSP





- Home Medications


Comprehensive Discharge Medication List: 


Ambulatory Orders





Aspirin [Aspirin EC] 81 mg PO HS 12/01/18 


Metformin HCl [Glucophage] 500 mg PO BID 12/01/18 


Tamsulosin HCl [Flomax] 0.4 mg PO BID 12/01/18 


Valsartan/Hydrochlorothiazide [Valsartan-Hctz 160-25 mg Tab] 1 each PO DAILY 12/ 01/18 








This patient is new to me today: No


Emergency Visit: No


Critical Care patient: No





- Discharge Referral


Referred to [facility] No

## 2018-12-06 NOTE — PN
Progress Note, Physician


Chief Complaint: 





Pt A&OX3;lying in bed; wife is at bedside; no further dizziness; no chest pain.


History of Present Illness: 





The patient is a 78 year old male, with a significant PMH of HTN, Lyme disease (

diagnosed in August; -->4 months of antibiotics; his wife believes his evergy 

level decreased since the diagnosis) and enlarged prostate,  who presents to 

the emergency department with an episode of vertigo that occurred 2 hours ago. 

The patient states he bent down to get something in the bathroom when he 

suddenly felt like the whole room was spinning. The patient admits to losing 

consciousness for a few seconds, falling and apparently hitting right sided 

body something within the bathroom. Family found patient conscious without 

evidence of seizure activity or incontinence. He experienced 1 episode of 

vomiting after the incident. The patient states this has never happened to him 

before and is currently complaining of right sided rib pain. He mentions he 

usually takes aspirin (81 mg)  everyday but didnt take it today. The patient 

denies chest pain, shortness of breath and  headache. Denies fever, chills, 

nausea, vomit, diarrhea and constipation.


 


Allergies: NKDA





- Current Medication List


Current Medications: 


Active Medications





Acetaminophen (Tylenol -)  650 mg PO Q6H PRN


   PRN Reason: PAIN LEVEL 6-10


   Last Admin: 12/03/18 15:56 Dose:  650 mg


Docusate Sodium (Colace -)  300 mg PO [facility]


   Last Admin: 12/05/18 21:24 Dose:  300 mg


Lactated Ringer's (Lactated Ringers Solution)  1,000 ml in 1,000 mls @ 75 mls/

hr IV ASDIR [facility]


   Last Admin: 12/05/18 13:09 Dose:  75 mls/hr


Insulin Aspart (Novolog Vial Sliding Scale -)  1 vial SQ [facility]; Protocol


   Last Admin: 12/06/18 06:17 Dose:  Not Given


Oxycodone HCl (Roxicodone -)  5 mg PO Q6H PRN


   PRN Reason: PAIN LEVEL 6-10


Polyethylene Glycol (Miralax (For Daily Use) -)  17 gm PO BID [facility]


   Last Admin: 12/05/18 21:25 Dose:  17 grams


Tamsulosin HCl (Flomax -)  0.4 mg PO BID@0830,2200 [facility]


   Last Admin: 12/05/18 21:25 Dose:  0.4 mg


Valsartan (Diovan -)  160 mg PO DAILY JYOTI


   Last Admin: 12/05/18 13:25 Dose:  160 mg











- Objective


Vital Signs: 


 Vital Signs











Temperature  98.6 F   12/06/18 06:00


 


Pulse Rate  60   12/06/18 06:00


 


Respiratory Rate  18   12/06/18 06:00


 


Blood Pressure  118/66   12/06/18 06:00


 


O2 Sat by Pulse Oximetry (%)  95   12/06/18 06:00











Labs: 


 CBC, BMP





 12/06/18 05:30 





 12/06/18 05:30 





 INR, PTT











INR  1.16  (0.82-1.09)   12/03/18  08:23    














Problem List





- Problems


(1) HTN (hypertension)


Assessment/Plan: 


On valsartan 160 mg daily.





Code(s): I10 - ESSENTIAL (PRIMARY) HYPERTENSION   





(2) Multiple rib fractures


Code(s): S22.49XA - MULTIPLE FRACTURES OF RIBS, UNSP SIDE, INIT FOR CLOS FX   


Qualifiers: 


   Encounter type: initial encounter   Fracture type: closed   Laterality: 

right   Qualified Code(s): S22.41XA - Multiple fractures of ribs, right side, 

initial encounter for closed fracture   





(3) Renal hematoma, right


Code(s): S37.011A - MINOR CONTUSION OF RIGHT KIDNEY, INITIAL ENCOUNTER   


Qualifiers: 


   Encounter type: initial encounter   Qualified Code(s): S37.011A - Minor 

contusion of right kidney, initial encounter   





(4) Syncope


Assessment/Plan: 


[facility] followed by EP evaluation.


Code(s): R55 - SYNCOPE AND COLLAPSE   


Qualifiers: 


   Syncope type: unspecified   Qualified Code(s): R55 - Syncope and collapse   





(5) Prostate disorder


Code(s): N42.9 - DISORDER OF PROSTATE, UNSPECIFIED   





(6) Diabetes


Code(s): E11.9 - TYPE 2 DIABETES MELLITUS WITHOUT COMPLICATIONS

## 2019-02-15 NOTE — EKG
Test Reason : 

Blood Pressure : ***/*** mmHG

Vent. Rate : 050 BPM     Atrial Rate : 050 BPM

   P-R Int : 162 ms          QRS Dur : 092 ms

    QT Int : 448 ms       P-R-T Axes : 037 043 037 degrees

   QTc Int : 408 ms

 

SINUS BRADYCARDIA

OTHERWISE NORMAL ECG

WHEN COMPARED WITH ECG OF 01-DEC-2018 19:11,

NO SIGNIFICANT CHANGE WAS FOUND

Confirmed by JASPER TOLEDO MD ([id number]) on 2/15/2019 8:32:38 PM

 

Referred By: Physician Emergency Dept           Confirmed By:JASPER TOLEDO MD